# Patient Record
Sex: FEMALE | Race: WHITE | NOT HISPANIC OR LATINO | Employment: OTHER | URBAN - NONMETROPOLITAN AREA
[De-identification: names, ages, dates, MRNs, and addresses within clinical notes are randomized per-mention and may not be internally consistent; named-entity substitution may affect disease eponyms.]

---

## 2023-03-14 DIAGNOSIS — G43.909 MIGRAINE WITHOUT STATUS MIGRAINOSUS, NOT INTRACTABLE, UNSPECIFIED MIGRAINE TYPE: Primary | ICD-10-CM

## 2023-03-14 RX ORDER — TOPIRAMATE 50 MG/1
50 TABLET, FILM COATED ORAL 2 TIMES DAILY
Qty: 180 TABLET | Refills: 1 | Status: SHIPPED | OUTPATIENT
Start: 2023-03-14 | End: 2023-08-14

## 2023-05-22 DIAGNOSIS — J42 CHRONIC BRONCHITIS, UNSPECIFIED CHRONIC BRONCHITIS TYPE (MULTI): Primary | ICD-10-CM

## 2023-05-22 RX ORDER — FLUTICASONE FUROATE, UMECLIDINIUM BROMIDE AND VILANTEROL TRIFENATATE 100; 62.5; 25 UG/1; UG/1; UG/1
POWDER RESPIRATORY (INHALATION)
Qty: 90 EACH | Refills: 3 | Status: SHIPPED | OUTPATIENT
Start: 2023-05-22

## 2023-06-15 ENCOUNTER — OFFICE VISIT (OUTPATIENT)
Dept: PRIMARY CARE | Facility: CLINIC | Age: 78
End: 2023-06-15
Payer: MEDICARE

## 2023-06-15 VITALS
WEIGHT: 114 LBS | SYSTOLIC BLOOD PRESSURE: 118 MMHG | BODY MASS INDEX: 23.03 KG/M2 | DIASTOLIC BLOOD PRESSURE: 68 MMHG | HEART RATE: 62 BPM | OXYGEN SATURATION: 98 %

## 2023-06-15 DIAGNOSIS — J44.9 CHRONIC OBSTRUCTIVE PULMONARY DISEASE, UNSPECIFIED COPD TYPE (MULTI): ICD-10-CM

## 2023-06-15 DIAGNOSIS — R73.03 PREDIABETES: ICD-10-CM

## 2023-06-15 DIAGNOSIS — F32.5 DEPRESSION, MAJOR, IN REMISSION (CMS-HCC): ICD-10-CM

## 2023-06-15 DIAGNOSIS — E23.6 PITUITARY GLAND ENLARGED (MULTI): ICD-10-CM

## 2023-06-15 DIAGNOSIS — G91.9 HYDROCEPHALUS, UNSPECIFIED TYPE (MULTI): ICD-10-CM

## 2023-06-15 DIAGNOSIS — G43.109 MIGRAINE WITH AURA AND WITHOUT STATUS MIGRAINOSUS, NOT INTRACTABLE: ICD-10-CM

## 2023-06-15 DIAGNOSIS — I10 BENIGN ESSENTIAL HYPERTENSION: Primary | ICD-10-CM

## 2023-06-15 PROBLEM — R90.89 ABNORMAL BRAIN MRI: Status: ACTIVE | Noted: 2023-06-15

## 2023-06-15 PROBLEM — M25.511 PAIN IN JOINT OF RIGHT SHOULDER: Status: ACTIVE | Noted: 2023-06-15

## 2023-06-15 PROBLEM — K21.9 CHRONIC GERD: Status: ACTIVE | Noted: 2023-06-15

## 2023-06-15 PROBLEM — Z98.2 VENTRICULO-PERITONEAL SHUNT STATUS: Status: ACTIVE | Noted: 2023-06-15

## 2023-06-15 PROBLEM — F41.9 ANXIETY DISORDER: Status: ACTIVE | Noted: 2023-06-15

## 2023-06-15 PROBLEM — M79.2 NEURALGIA INVOLVING SCALP: Status: RESOLVED | Noted: 2023-06-15 | Resolved: 2023-06-15

## 2023-06-15 PROBLEM — R06.09 DYSPNEA ON EXERTION: Status: ACTIVE | Noted: 2023-06-15

## 2023-06-15 PROBLEM — M25.512 PAIN IN LEFT SHOULDER: Status: ACTIVE | Noted: 2023-06-15

## 2023-06-15 PROBLEM — M19.90 ARTHRITIS: Status: ACTIVE | Noted: 2023-06-15

## 2023-06-15 PROBLEM — H91.90 DECREASED HEARING: Status: ACTIVE | Noted: 2023-06-15

## 2023-06-15 PROBLEM — M54.2 CERVICALGIA: Status: ACTIVE | Noted: 2023-06-15

## 2023-06-15 PROBLEM — G43.119 INTRACTABLE MIGRAINE EQUIVALENT: Status: RESOLVED | Noted: 2023-06-15 | Resolved: 2023-06-15

## 2023-06-15 PROBLEM — R51.9 OCCIPITAL HEADACHE: Status: RESOLVED | Noted: 2023-06-15 | Resolved: 2023-06-15

## 2023-06-15 PROBLEM — H61.20 WAX IN EAR: Status: RESOLVED | Noted: 2023-06-15 | Resolved: 2023-06-15

## 2023-06-15 PROBLEM — M85.80 OSTEOPENIA: Status: ACTIVE | Noted: 2023-06-15

## 2023-06-15 PROBLEM — E78.00 ELEVATED LDL CHOLESTEROL LEVEL: Status: ACTIVE | Noted: 2023-06-15

## 2023-06-15 PROBLEM — R53.83 OTHER FATIGUE: Status: RESOLVED | Noted: 2023-06-15 | Resolved: 2023-06-15

## 2023-06-15 PROBLEM — R51.9 OCCIPITAL HEADACHE: Status: ACTIVE | Noted: 2023-06-15

## 2023-06-15 PROBLEM — D62 ANEMIA DUE TO ACUTE BLOOD LOSS: Status: ACTIVE | Noted: 2023-06-15

## 2023-06-15 PROBLEM — E87.6 HYPOKALEMIA: Status: ACTIVE | Noted: 2023-06-15

## 2023-06-15 PROBLEM — M75.121 COMPLETE TEAR OF RIGHT ROTATOR CUFF: Status: ACTIVE | Noted: 2023-06-15

## 2023-06-15 PROBLEM — M79.2 NEURALGIA INVOLVING SCALP: Status: ACTIVE | Noted: 2023-06-15

## 2023-06-15 PROBLEM — K11.20 SUBMANDIBULAR SIALOADENITIS: Status: ACTIVE | Noted: 2023-06-15

## 2023-06-15 PROBLEM — H61.20 WAX IN EAR: Status: ACTIVE | Noted: 2023-06-15

## 2023-06-15 PROBLEM — F41.9 ANXIETY DISORDER: Status: RESOLVED | Noted: 2023-06-15 | Resolved: 2023-06-15

## 2023-06-15 PROBLEM — G43.909 HEADACHE, MIGRAINE: Status: ACTIVE | Noted: 2023-06-15

## 2023-06-15 PROBLEM — G43.119 INTRACTABLE MIGRAINE EQUIVALENT: Status: ACTIVE | Noted: 2023-06-15

## 2023-06-15 PROBLEM — K21.9 CHRONIC GERD: Status: RESOLVED | Noted: 2023-06-15 | Resolved: 2023-06-15

## 2023-06-15 PROBLEM — R53.83 OTHER FATIGUE: Status: ACTIVE | Noted: 2023-06-15

## 2023-06-15 PROBLEM — I05.9 DISORDER OF MITRAL VALVE: Status: ACTIVE | Noted: 2023-06-15

## 2023-06-15 PROBLEM — G96.810 INTRACRANIAL HYPOTENSION: Status: ACTIVE | Noted: 2023-06-15

## 2023-06-15 LAB
ALANINE AMINOTRANSFERASE (SGPT) (U/L) IN SER/PLAS: 12 U/L (ref 7–45)
ALBUMIN (G/DL) IN SER/PLAS: 4.4 G/DL (ref 3.4–5)
ALKALINE PHOSPHATASE (U/L) IN SER/PLAS: 67 U/L (ref 33–136)
ANION GAP IN SER/PLAS: 14 MMOL/L (ref 10–20)
ASPARTATE AMINOTRANSFERASE (SGOT) (U/L) IN SER/PLAS: 15 U/L (ref 9–39)
BASOPHILS (10*3/UL) IN BLOOD BY AUTOMATED COUNT: 0.06 X10E9/L (ref 0–0.1)
BASOPHILS/100 LEUKOCYTES IN BLOOD BY AUTOMATED COUNT: 1.3 % (ref 0–2)
BILIRUBIN TOTAL (MG/DL) IN SER/PLAS: 0.8 MG/DL (ref 0–1.2)
CALCIUM (MG/DL) IN SER/PLAS: 9.6 MG/DL (ref 8.6–10.3)
CARBON DIOXIDE, TOTAL (MMOL/L) IN SER/PLAS: 23 MMOL/L (ref 21–32)
CHLORIDE (MMOL/L) IN SER/PLAS: 111 MMOL/L (ref 98–107)
CHOLESTEROL (MG/DL) IN SER/PLAS: 153 MG/DL (ref 0–199)
CHOLESTEROL IN HDL (MG/DL) IN SER/PLAS: 62 MG/DL
CHOLESTEROL/HDL RATIO: 2.5
CREATININE (MG/DL) IN SER/PLAS: 0.84 MG/DL (ref 0.5–1.05)
EOSINOPHILS (10*3/UL) IN BLOOD BY AUTOMATED COUNT: 0.2 X10E9/L (ref 0–0.4)
EOSINOPHILS/100 LEUKOCYTES IN BLOOD BY AUTOMATED COUNT: 4.4 % (ref 0–6)
ERYTHROCYTE DISTRIBUTION WIDTH (RATIO) BY AUTOMATED COUNT: 15.9 % (ref 11.5–14.5)
ERYTHROCYTE MEAN CORPUSCULAR HEMOGLOBIN CONCENTRATION (G/DL) BY AUTOMATED: 31.1 G/DL (ref 32–36)
ERYTHROCYTE MEAN CORPUSCULAR VOLUME (FL) BY AUTOMATED COUNT: 98 FL (ref 80–100)
ERYTHROCYTES (10*6/UL) IN BLOOD BY AUTOMATED COUNT: 3.45 X10E12/L (ref 4–5.2)
GFR FEMALE: 72 ML/MIN/1.73M2
GLUCOSE (MG/DL) IN SER/PLAS: 93 MG/DL (ref 74–99)
HEMATOCRIT (%) IN BLOOD BY AUTOMATED COUNT: 33.8 % (ref 36–46)
HEMOGLOBIN (G/DL) IN BLOOD: 10.5 G/DL (ref 12–16)
IMMATURE GRANULOCYTES/100 LEUKOCYTES IN BLOOD BY AUTOMATED COUNT: 0.4 % (ref 0–0.9)
LDL: 73 MG/DL (ref 0–99)
LEUKOCYTES (10*3/UL) IN BLOOD BY AUTOMATED COUNT: 4.6 X10E9/L (ref 4.4–11.3)
LYMPHOCYTES (10*3/UL) IN BLOOD BY AUTOMATED COUNT: 1.28 X10E9/L (ref 0.8–3)
LYMPHOCYTES/100 LEUKOCYTES IN BLOOD BY AUTOMATED COUNT: 28.1 % (ref 13–44)
MONOCYTES (10*3/UL) IN BLOOD BY AUTOMATED COUNT: 0.34 X10E9/L (ref 0.05–0.8)
MONOCYTES/100 LEUKOCYTES IN BLOOD BY AUTOMATED COUNT: 7.5 % (ref 2–10)
NEUTROPHILS (10*3/UL) IN BLOOD BY AUTOMATED COUNT: 2.65 X10E9/L (ref 1.6–5.5)
NEUTROPHILS/100 LEUKOCYTES IN BLOOD BY AUTOMATED COUNT: 58.3 % (ref 40–80)
PLATELETS (10*3/UL) IN BLOOD AUTOMATED COUNT: 252 X10E9/L (ref 150–450)
POTASSIUM (MMOL/L) IN SER/PLAS: 4.7 MMOL/L (ref 3.5–5.3)
PROTEIN TOTAL: 6.6 G/DL (ref 6.4–8.2)
SODIUM (MMOL/L) IN SER/PLAS: 143 MMOL/L (ref 136–145)
THYROTROPIN (MIU/L) IN SER/PLAS BY DETECTION LIMIT <= 0.05 MIU/L: 0.86 MIU/L (ref 0.44–3.98)
TRIGLYCERIDE (MG/DL) IN SER/PLAS: 91 MG/DL (ref 0–149)
UREA NITROGEN (MG/DL) IN SER/PLAS: 10 MG/DL (ref 6–23)
VLDL: 18 MG/DL (ref 0–40)

## 2023-06-15 PROCEDURE — 80053 COMPREHEN METABOLIC PANEL: CPT

## 2023-06-15 PROCEDURE — 3074F SYST BP LT 130 MM HG: CPT | Performed by: FAMILY MEDICINE

## 2023-06-15 PROCEDURE — 1036F TOBACCO NON-USER: CPT | Performed by: FAMILY MEDICINE

## 2023-06-15 PROCEDURE — 1159F MED LIST DOCD IN RCRD: CPT | Performed by: FAMILY MEDICINE

## 2023-06-15 PROCEDURE — 85025 COMPLETE CBC W/AUTO DIFF WBC: CPT

## 2023-06-15 PROCEDURE — 3078F DIAST BP <80 MM HG: CPT | Performed by: FAMILY MEDICINE

## 2023-06-15 PROCEDURE — 83036 HEMOGLOBIN GLYCOSYLATED A1C: CPT

## 2023-06-15 PROCEDURE — 99214 OFFICE O/P EST MOD 30 MIN: CPT | Performed by: FAMILY MEDICINE

## 2023-06-15 PROCEDURE — 84443 ASSAY THYROID STIM HORMONE: CPT

## 2023-06-15 PROCEDURE — 80061 LIPID PANEL: CPT

## 2023-06-15 RX ORDER — ALBUTEROL SULFATE 90 UG/1
AEROSOL, METERED RESPIRATORY (INHALATION)
COMMUNITY
Start: 2014-12-27 | End: 2023-08-21

## 2023-06-15 RX ORDER — LISINOPRIL 10 MG/1
1 TABLET ORAL DAILY
COMMUNITY
Start: 2013-07-13 | End: 2023-08-14

## 2023-06-15 RX ORDER — ASPIRIN 81 MG/1
1 TABLET ORAL DAILY
COMMUNITY
Start: 2018-04-26

## 2023-06-15 RX ORDER — AMLODIPINE BESYLATE 5 MG/1
1 TABLET ORAL DAILY
COMMUNITY
Start: 2013-06-29 | End: 2023-06-27 | Stop reason: SDUPTHER

## 2023-06-15 RX ORDER — ESCITALOPRAM OXALATE 20 MG/1
1 TABLET ORAL DAILY
COMMUNITY
Start: 2013-06-16 | End: 2023-06-27 | Stop reason: SDUPTHER

## 2023-06-15 RX ORDER — FLUTICASONE PROPIONATE AND SALMETEROL 250; 50 UG/1; UG/1
POWDER RESPIRATORY (INHALATION) 2 TIMES DAILY
COMMUNITY
End: 2023-09-07 | Stop reason: ALTCHOICE

## 2023-06-15 RX ORDER — BUPROPION HYDROCHLORIDE 300 MG/1
1 TABLET ORAL DAILY
COMMUNITY
Start: 2013-06-29 | End: 2023-06-27 | Stop reason: SDUPTHER

## 2023-06-15 RX ORDER — ACETAMINOPHEN 500 MG
TABLET ORAL EVERY 6 HOURS PRN
COMMUNITY
End: 2023-09-07 | Stop reason: ALTCHOICE

## 2023-06-15 RX ORDER — OMEPRAZOLE 20 MG/1
1 CAPSULE, DELAYED RELEASE ORAL DAILY
COMMUNITY
Start: 2018-04-20 | End: 2023-06-27 | Stop reason: SDUPTHER

## 2023-06-15 RX ORDER — ATORVASTATIN CALCIUM 80 MG/1
1 TABLET, FILM COATED ORAL NIGHTLY
COMMUNITY
Start: 2012-08-01 | End: 2023-06-27 | Stop reason: SDUPTHER

## 2023-06-15 NOTE — PROGRESS NOTES
"Subjective   Patient ID: Paz Farmer is a 77 y.o. female who presents for Follow-up.    HPI     MEDICARE WELLNESS WAS IN JANUARY    We had a telehealth appt in march - mood was much worse-   Seemed to correspond to increase of Topirimate -   I decreased it back down to 50 mg BID      Updates and concerns -      Did have to have shunt revision with DR Ibarra - 12/27/22  \"Right Ventriculoperitoneal Shunt Valve Exchange\"   Was having intracranial hypotension - severe headaches -   Head feels great.   No headaches.      Would like to try to get rid of omeprazole too        Chronic issues and meds:      HTN - amlodipine 5 mg daily; lisinopril 10 mg a day; BPs are running well  Stress test 5/2018 WNL, EF 60%   2010 - Coronary Cath done - clear but a small rt cor artery      Prediabetes: Last A1C - 1/2023 -  5.7%   Nutrition: has been eating better ; KEEPING WT OFF     Upper teeth have been removed - issues with getting a plate that doesn't hurt      COPD - TRELEGY QD , Proair prn - WORKING GREAT      Hyperchol - atorvastatin 80 mg a day      Chronic migraines - Topirimate 50 mg  mg BID   She was taking a lot of aspirin and aleve before the topirimate     MRI with enlarged pituitary - saw endocrinology - labs were fine, she says she did visual field test and it was ok.      Depression - Bupropion  mg a day, escitalopram 20 mg a day - Mood is doing much better      LABS JULY 2021 - Renal function mildly low -   and HBG mildly low - stool cards deedee Price is her significant other - they have been together 35 years         Review of Systems    Objective   /68 (BP Location: Right leg, Patient Position: Sitting, BP Cuff Size: Adult)   Pulse 62   Wt 51.7 kg (114 lb)   SpO2 98%   BMI 23.03 kg/m²     Physical Exam  Vitals reviewed.   Constitutional:       General: She is not in acute distress.     Appearance: Normal appearance.   HENT:      Head: Normocephalic and atraumatic.      Nose: Nose normal. "      Mouth/Throat:      Pharynx: No posterior oropharyngeal erythema.      Comments: Edentulous top teeth  Eyes:      Extraocular Movements: Extraocular movements intact.      Conjunctiva/sclera: Conjunctivae normal.      Pupils: Pupils are equal, round, and reactive to light.   Cardiovascular:      Rate and Rhythm: Normal rate and regular rhythm.      Heart sounds: Normal heart sounds. No murmur heard.  Pulmonary:      Effort: Pulmonary effort is normal. No respiratory distress.      Breath sounds: Normal breath sounds. No wheezing.   Musculoskeletal:      Cervical back: Neck supple.   Lymphadenopathy:      Cervical: No cervical adenopathy.   Skin:     General: Skin is warm and dry.      Findings: No rash.   Neurological:      General: No focal deficit present.      Mental Status: She is alert.   Psychiatric:         Mood and Affect: Mood normal.         Thought Content: Thought content normal.         Assessment/Plan   Problem List Items Addressed This Visit          High    Benign essential hypertension - Primary    Chronic obstructive pulmonary disease (CMS/HCC)     Stable with Trelegy          Prediabetes       Medium    Depression, major, in remission (CMS/HCC)     Mood doing well - no change in meds          Headache, migraine    Pituitary gland enlarged (CMS/HCC)     Was felt to be benign and stable          Hydrocephalus, unspecified type (CMS/HCC)     Shunt in place - doing well             Doing very well -   Going to try to wean off the topirimate and omeprazole     Not other med changes    Labs today    Discussed finding a dentist that will help her get a better upper plate     We discussed at visit any disease processes that were of concern as well as the risks, benefits and instructions of any new medication provided.    See orders and discussion section for information handed to patient on their Clinical Summary.   Patient (and/or caretaker of patient if present)  stated all questions were answered,  and they voiced understanding of instructions.

## 2023-06-15 NOTE — PATIENT INSTRUCTIONS
Lets try to wean you off of your topirmate and see if you still need it -   Take 1/2 tab less every 5 days until you are off it   So -   25 mg in the AM and 50 mg PM for 5 days -   Then 25mg twice a day for 5 days   Then 25 mg PM only for 5 days  - then stop taking it.    IF headaches come back - then you need to slowly increase it back up to 50 mg twice a day       Then if you are fine - can try weaning off omeprazole -   Take it every other day for 1 weeks - then every 3 days for 2 weeks - then stop it.       Hypertension Reminders:    IF YOU ARE A PERSON WHOSE BLOOD PRESSURE RUNS HIGH IN THE DOCTOR'S OFFICE,  THEN WE NEED TO VERIFY YOUR CUFF AT LEAST  ONCE YEARLY.   ALWAYS BRING CUFF WITH YOU TO ANY HYPERTENSION CHECK UP APPOINTMENT.  WE CAN RECORD YOUR BP  FROM HOME THE DAY OF THE APPOINTMENT - BUT WE HAVE TO SEE IT ON YOUR MACHINE.      To accurately check your blood pressure -  be sure to sit and relax for 5 minutes, you need your back supported, feet flat on the ground, arm heart level and relaxed.    Generally speaking, well controlled hypertension is below 130/80   for  most people  and if you are over 75, below 140/90  is acceptable.    Please take your medication as directed, and if you forget a dose  DO NOT DOUBLE THE DOSE THE NEXT DAY, just take is as you normally would.     It is important to stay on a low sodium diet :  1500 - 2000 mg of sodium a day  -  it is important to read labels.    Regular Exercise is very important as well.  Always gradually increase your exercise regimen.  Your goal is 30 minutes of a good cardiovascular exercise at least 5 days a week.    IF YOUR BMI (BODY MASS INDEX) IS OVER 25, LOSING WEIGHT WILL HELP CONTROL YOUR BLOOD PRESSURE.   Talk to me further if you need help doing this.        It is very important NOT to smoke  or use any tobacco products.  Talk to me about options if you want help quitting.    It is very important to keep your alcohol in take low.   Generally  speaking, adult men should not drink more than 2 regular size beers a day, or no more than 2 ounces of liquor, or no more than 12 ounces of wine.  For adult women - the recommendations are half that.    BUT , THIS IS NOT UNIVERSAL   - be sure to ask me if alcohol is safe for you to drink, and if so, the acceptable amount.          PLEASE PLAN YOUR NEXT APPOINTMENT WITH ME IN    January - Medicare wellness         ;   ALWAYS BE SURE TO CALL AT LEAST 6 WEEKS AHEAD OF WHEN YOU NEED THE APPOINTMENT TO BE SCHEDULED.      IF I HAVE TOLD YOU TO GET LABS AHEAD OF THE APPOINTMENT WITH ME TO GO OVER TOGETHER AT OUR APPOINTMENT,  BE SURE TO MAKE A LAB APPOINTMENT A FEW DAYS AHEAD OF YOUR VISIT WITH ME, AND LEAVE ME A MESSAGE CLOSE TO THE LAB APPT DATE TO REMIND ME TO PLACE ORDERS FOR THOSE LABS.      THANK YOU!

## 2023-06-16 LAB
ESTIMATED AVERAGE GLUCOSE FOR HBA1C: 120 MG/DL
HEMOGLOBIN A1C/HEMOGLOBIN TOTAL IN BLOOD: 5.8 %

## 2023-06-27 DIAGNOSIS — K21.9 GASTROESOPHAGEAL REFLUX DISEASE WITHOUT ESOPHAGITIS: ICD-10-CM

## 2023-06-27 DIAGNOSIS — F32.5 DEPRESSION, MAJOR, IN REMISSION (CMS-HCC): Primary | ICD-10-CM

## 2023-06-27 DIAGNOSIS — E78.00 ELEVATED LDL CHOLESTEROL LEVEL: ICD-10-CM

## 2023-06-27 DIAGNOSIS — I10 BENIGN ESSENTIAL HYPERTENSION: ICD-10-CM

## 2023-06-27 RX ORDER — AMLODIPINE BESYLATE 5 MG/1
TABLET ORAL
Qty: 90 TABLET | Refills: 1 | Status: SHIPPED | OUTPATIENT
Start: 2023-06-27 | End: 2023-12-18

## 2023-06-27 RX ORDER — BUPROPION HYDROCHLORIDE 300 MG/1
TABLET ORAL
Qty: 90 TABLET | Refills: 1 | Status: SHIPPED | OUTPATIENT
Start: 2023-06-27 | End: 2023-12-18

## 2023-06-27 RX ORDER — OMEPRAZOLE 20 MG/1
CAPSULE, DELAYED RELEASE ORAL
Qty: 90 CAPSULE | Refills: 1 | Status: SHIPPED | OUTPATIENT
Start: 2023-06-27 | End: 2023-09-07 | Stop reason: ALTCHOICE

## 2023-06-27 RX ORDER — ATORVASTATIN CALCIUM 80 MG/1
TABLET, FILM COATED ORAL
Qty: 90 TABLET | Refills: 1 | Status: SHIPPED | OUTPATIENT
Start: 2023-06-27 | End: 2023-12-18

## 2023-06-27 RX ORDER — ESCITALOPRAM OXALATE 20 MG/1
TABLET ORAL
Qty: 90 TABLET | Refills: 3 | Status: SHIPPED | OUTPATIENT
Start: 2023-06-27

## 2023-08-13 DIAGNOSIS — G43.909 MIGRAINE WITHOUT STATUS MIGRAINOSUS, NOT INTRACTABLE, UNSPECIFIED MIGRAINE TYPE: ICD-10-CM

## 2023-08-13 DIAGNOSIS — I10 BENIGN ESSENTIAL HYPERTENSION: Primary | ICD-10-CM

## 2023-08-14 RX ORDER — TOPIRAMATE 50 MG/1
50 TABLET, FILM COATED ORAL 2 TIMES DAILY
Qty: 180 TABLET | Refills: 1 | Status: SHIPPED | OUTPATIENT
Start: 2023-08-14 | End: 2023-09-07 | Stop reason: ALTCHOICE

## 2023-08-14 RX ORDER — LISINOPRIL 10 MG/1
10 TABLET ORAL DAILY
Qty: 90 TABLET | Refills: 3 | Status: SHIPPED | OUTPATIENT
Start: 2023-08-14

## 2023-08-20 DIAGNOSIS — J44.9 CHRONIC OBSTRUCTIVE PULMONARY DISEASE, UNSPECIFIED COPD TYPE (MULTI): Primary | ICD-10-CM

## 2023-08-21 RX ORDER — ALBUTEROL SULFATE 90 UG/1
AEROSOL, METERED RESPIRATORY (INHALATION)
Qty: 54 G | Refills: 3 | Status: SHIPPED | OUTPATIENT
Start: 2023-08-21

## 2023-09-07 ENCOUNTER — OFFICE VISIT (OUTPATIENT)
Dept: PRIMARY CARE | Facility: CLINIC | Age: 78
End: 2023-09-07
Payer: MEDICARE

## 2023-09-07 VITALS
HEIGHT: 58 IN | SYSTOLIC BLOOD PRESSURE: 110 MMHG | OXYGEN SATURATION: 97 % | WEIGHT: 114.4 LBS | DIASTOLIC BLOOD PRESSURE: 70 MMHG | BODY MASS INDEX: 24.01 KG/M2 | HEART RATE: 67 BPM

## 2023-09-07 DIAGNOSIS — Z01.818 PREOPERATIVE EXAMINATION: Primary | ICD-10-CM

## 2023-09-07 PROBLEM — M75.102 TEAR OF LEFT ROTATOR CUFF: Status: RESOLVED | Noted: 2023-09-07 | Resolved: 2023-09-07

## 2023-09-07 PROCEDURE — 1125F AMNT PAIN NOTED PAIN PRSNT: CPT

## 2023-09-07 PROCEDURE — 1160F RVW MEDS BY RX/DR IN RCRD: CPT

## 2023-09-07 PROCEDURE — 3074F SYST BP LT 130 MM HG: CPT

## 2023-09-07 PROCEDURE — 3078F DIAST BP <80 MM HG: CPT

## 2023-09-07 PROCEDURE — 1159F MED LIST DOCD IN RCRD: CPT

## 2023-09-07 PROCEDURE — 1036F TOBACCO NON-USER: CPT

## 2023-09-07 PROCEDURE — 99213 OFFICE O/P EST LOW 20 MIN: CPT

## 2023-09-07 NOTE — PROGRESS NOTES
Subjective   Patient ID: Paz Farmer is a 77 y.o. female who presents for Pre-op Exam (cataracts).  VITA Mullen presents for pre-op exam for cataract surgery on 9/20/23 (R) and 10/4/2023 (L) with Dr. Ibrahim.   She says she feels good otherwise, no other concerns. She is excited for her surgery to be able to see better.    Revised Cardiac Index:   How did you do previously with anesthesia? Has had many surgeries before she did fine, no concerns   Can you walk up 2 flights of stairs without having chest pain? Yes  MI hx: NO  CVA hx: NO  CKD/Cr >2.0: NO  CHF: NO  DM using insulin: NO  High risk surgery: NO    Class I Risk: 0 Points - 3.9% 30-day risk of death, MI, or cardiac arrest     Surgical Risk Assessment:   Prior Anesthesia: She had prior anesthesia, no prior adverse reaction to edidural anesthesia and no prior adverse reaction to general anesthesia.   Exercise Capacity: able to walk two flights of stairs without symptoms.   Lifestyle Factors: denies alcohol use, denies tobacco use and denies illegal drug use.   Quit smoking in 2002 - smoked for 4 years or so, 1/2 pack a day   Symptoms: no easy bleeding, some easy bruising is on ASA 81mg, no chest pain, no cough, no dyspnea, no edema, no palpitations and no wheezing.   Has COPD   Does walk every other day for exercise     Patient Active Problem List   Diagnosis    Abnormal brain MRI    Anemia due to acute blood loss    Arthritis    Benign essential hypertension    Chronic obstructive pulmonary disease (CMS/HCC)    Complete tear of right rotator cuff    Decreased hearing    Depression, major, in remission (CMS/HCC)    Disorder of mitral valve    Dyspnea on exertion    Elevated LDL cholesterol level    Headache, migraine    Hypokalemia    Intracranial hypotension    Cervicalgia    Osteopenia    Pain in joint of right shoulder    Pain in left shoulder    Pituitary gland enlarged (CMS/HCC)    Prediabetes    Submandibular sialoadenitis    Ventriculo-peritoneal  shunt status    Hydrocephalus, unspecified type (CMS/[facility])     Past Surgical History:   Procedure Laterality Date    APPENDECTOMY  2013    Appendectomy    CARDIAC CATHETERIZATION  10/30/2014    Cardiac Cath Procedure Summary     SECTION, LOW TRANSVERSE  2013     Section Low Transverse    COLONOSCOPY  2013    Complete Colonoscopy    CYSTOSCOPY  2013    Diagnostic Cystoscopy    FOOT SURGERY  2013    Foot Surgery    MR HEAD ANGIO WO IV CONTRAST  2015    MR HEAD ANGIO WO IV CONTRAST 2015 [facility]    OTHER SURGICAL HISTORY  2022    Laparoscopy    OTHER SURGICAL HISTORY  2022    Hernia repair    OTHER SURGICAL HISTORY  2013    Ear Surgery    OTHER SURGICAL HISTORY  2022    Brain Surgery    OTHER SURGICAL HISTORY  2013    Ventricular Shunt    OTHER SURGICAL HISTORY  2013    Arthroscopy Shoulder Right    STOMACH SURGERY  2013    Gastric Surgery    TOTAL SHOULDER ARTHROPLASTY  2019    Shoulder Arthroplasty Total Shoulder Replacement      Past Medical History:   Diagnosis Date    Body mass index (BMI) 27.0-27.9, adult 2021    Body mass index (BMI) of 27.0 to 27.9 in adult    Body mass index (BMI) 28.0-28.9, adult 10/13/2020    Body mass index (BMI) of 28.0 to 28.9 in adult    Cutaneous abscess of unspecified hand 2014    Abscess of finger    Disease of jaws, unspecified 2016    Disorder of jaw    Dysuria 2013    Dysuria    Encounter for follow-up examination after completed treatment for conditions other than malignant neoplasm 2013    Follow-up exam    Encounter for other preprocedural examination 2014    Pre-op testing    Headache, unspecified 2022    Chronic intractable headache, unspecified headache type    Migraine, unspecified, not intractable, without status migrainosus 2022    Headache, migraine    Noninfective gastroenteritis and colitis, unspecified      Acute colitis    Other conditions influencing health status 12/11/2020    History of cough    Other conditions influencing health status 11/05/2014    Cyst    Other conditions influencing health status 11/16/2022    Craniotomy (Diagnostic)    Other diseases of salivary glands 09/13/2021    Salivary duct obstruction    Other specified disorders of left ear 11/10/2017    Ear canal mass, left    Personal history of diseases of the blood and blood-forming organs and certain disorders involving the immune mechanism 10/25/2019    History of anemia    Personal history of diseases of the blood and blood-forming organs and certain disorders involving the immune mechanism 08/05/2015    History of anemia    Personal history of other diseases of the digestive system 08/30/2021    History of parotitis    Personal history of other diseases of the musculoskeletal system and connective tissue 10/13/2020    History of bone disorder    Personal history of other endocrine, nutritional and metabolic disease 06/11/2019    History of hypokalemia    Personal history of other specified conditions 01/19/2018    History of gross hematuria    Personal history of other specified conditions     History of chest pain    Personal history of other specified conditions 04/13/2022    History of dysuria    Personal history of urinary (tract) infections 07/22/2013    Personal history of urinary tract infection    Personal history of urinary (tract) infections 04/19/2022    History of urinary tract infection    Right upper quadrant pain 04/04/2022    Abdominal pain, RUQ (right upper quadrant)    Sialolithiasis 08/30/2021    Stone of salivary gland    Tear of left rotator cuff 09/07/2023    Unspecified chronic otitis externa, left ear 07/22/2013    Chronic otitis externa of left ear    Unspecified otitis externa, left ear 01/26/2018    Otitis externa, left    Vomiting, unspecified 04/04/2022    Vomiting and diarrhea     Social History     Tobacco Use     "Smoking status: Former     Packs/day: 0.50     Years: 4.00     Additional pack years: 0.00     Total pack years: 2.00     Types: Cigarettes     Quit date:      Years since quittin.6    Smokeless tobacco: Never   Vaping Use    Vaping Use: Never used   Substance Use Topics    Alcohol use: Never    Drug use: Never      Review of Systems  10 point review of systems performed and is negative except as noted in the HPI.    No Known Allergies      Current Outpatient Medications:     albuterol 90 mcg/actuation inhaler, USE 1 TO 2 INHALATIONS     ORALLY EVERY 4 TO 6 HOURS  AS NEEDED AND AS DIRECTED, Disp: 54 g, Rfl: 3    amLODIPine (Norvasc) 5 mg tablet, TAKE 1 TABLET DAILY, Disp: 90 tablet, Rfl: 1    aspirin 81 mg EC tablet, Take 1 tablet (81 mg) by mouth once daily., Disp: , Rfl:     atorvastatin (Lipitor) 80 mg tablet, TAKE 1 TABLET AT BEDTIME, Disp: 90 tablet, Rfl: 1    buPROPion XL (Wellbutrin XL) 300 mg 24 hr tablet, TAKE 1 TABLET DAILY, Disp: 90 tablet, Rfl: 1    escitalopram (Lexapro) 20 mg tablet, TAKE 1 TABLET DAILY, Disp: 90 tablet, Rfl: 3    fluticasone-umeclidin-vilanter (Trelegy Ellipta) 100-62.5-25 mcg blister with device, USE 1 INHALATION ORALLY    DAILY (TO REPLACE ADVAIR   AND INCRUSE), Disp: 90 each, Rfl: 3    lisinopril 10 mg tablet, TAKE 1 TABLET DAILY, Disp: 90 tablet, Rfl: 3     Objective   /70   Pulse 67   Ht 1.473 m (4' 10\")   Wt 51.9 kg (114 lb 6.4 oz)   SpO2 97%   BMI 23.91 kg/m²     Physical Exam  Constitutional:       General: She is not in acute distress.     Appearance: Normal appearance. She is not ill-appearing or toxic-appearing.   HENT:      Head: Normocephalic and atraumatic.      Comments:  Edentulous top teeth     Right Ear: Tympanic membrane, ear canal and external ear normal.      Left Ear: Tympanic membrane, ear canal and external ear normal.      Nose: Nose normal. No congestion or rhinorrhea.      Mouth/Throat:      Mouth: Mucous membranes are moist.      " Pharynx: Oropharynx is clear. No oropharyngeal exudate or posterior oropharyngeal erythema.   Eyes:      Conjunctiva/sclera: Conjunctivae normal.      Pupils: Pupils are equal, round, and reactive to light.   Neck:      Vascular: No carotid bruit.      Trachea: Trachea normal.   Cardiovascular:      Rate and Rhythm: Normal rate and regular rhythm.      Pulses: Normal pulses.      Heart sounds: Normal heart sounds. No murmur heard.  Pulmonary:      Effort: Pulmonary effort is normal.      Breath sounds: Normal breath sounds. No wheezing, rhonchi or rales.   Abdominal:      General: Bowel sounds are normal. There is no distension.      Palpations: Abdomen is soft. There is no mass.      Tenderness: There is no abdominal tenderness. There is no guarding or rebound.   Musculoskeletal:         General: Normal range of motion.      Cervical back: Normal range of motion and neck supple. No tenderness.      Right lower leg: No edema.      Left lower leg: No edema.   Lymphadenopathy:      Cervical: No cervical adenopathy.   Skin:     General: Skin is warm and dry.      Findings: No rash.   Neurological:      Mental Status: She is alert and oriented to person, place, and time.   Psychiatric:         Mood and Affect: Mood normal.         Behavior: Behavior normal.         Assessment/Plan   Problem List Items Addressed This Visit    None  Visit Diagnoses       Preoperative examination    -  Primary          Paz is cleared for cataract surgery. Follow up as needed.    Chronic conditions:     Benign Essential HTN  -Stable on lisinopril 10mg and amlodipine 5mg     COPD   -Stable with trelegy  -Uses albuterol as needed    Depression   -Mood is doing well   -Stable on lexapro 20 and wellubtrin 300     Hydrocephalus   -Shunt in place     Discussed at visit any disease processes that were of concern as well as the risks, benefits and instructions on any new medication provided. Patient (and/or caretaker of patient if present) stated  all questions were answered, and they voiced understanding of instructions.

## 2023-12-18 DIAGNOSIS — I10 BENIGN ESSENTIAL HYPERTENSION: ICD-10-CM

## 2023-12-18 DIAGNOSIS — E78.00 ELEVATED LDL CHOLESTEROL LEVEL: ICD-10-CM

## 2023-12-18 DIAGNOSIS — F32.5 DEPRESSION, MAJOR, IN REMISSION (CMS-HCC): ICD-10-CM

## 2023-12-18 RX ORDER — BUPROPION HYDROCHLORIDE 300 MG/1
TABLET ORAL
Qty: 90 TABLET | Refills: 1 | Status: SHIPPED | OUTPATIENT
Start: 2023-12-18

## 2023-12-18 RX ORDER — ATORVASTATIN CALCIUM 80 MG/1
TABLET, FILM COATED ORAL
Qty: 90 TABLET | Refills: 1 | Status: SHIPPED | OUTPATIENT
Start: 2023-12-18

## 2023-12-18 RX ORDER — AMLODIPINE BESYLATE 5 MG/1
TABLET ORAL
Qty: 90 TABLET | Refills: 1 | Status: SHIPPED | OUTPATIENT
Start: 2023-12-18

## 2024-01-11 ENCOUNTER — OFFICE VISIT (OUTPATIENT)
Dept: PRIMARY CARE | Facility: CLINIC | Age: 79
End: 2024-01-11
Payer: MEDICARE

## 2024-01-11 VITALS
DIASTOLIC BLOOD PRESSURE: 72 MMHG | BODY MASS INDEX: 23.79 KG/M2 | SYSTOLIC BLOOD PRESSURE: 118 MMHG | OXYGEN SATURATION: 98 % | HEIGHT: 59 IN | HEART RATE: 72 BPM | WEIGHT: 118 LBS

## 2024-01-11 DIAGNOSIS — M85.80 OSTEOPENIA, UNSPECIFIED LOCATION: ICD-10-CM

## 2024-01-11 DIAGNOSIS — Z78.0 POSTMENOPAUSAL ESTROGEN DEFICIENCY: ICD-10-CM

## 2024-01-11 DIAGNOSIS — Z12.11 COLON CANCER SCREENING: ICD-10-CM

## 2024-01-11 DIAGNOSIS — I10 BENIGN ESSENTIAL HYPERTENSION: ICD-10-CM

## 2024-01-11 DIAGNOSIS — Z00.00 ROUTINE GENERAL MEDICAL EXAMINATION AT HEALTH CARE FACILITY: Primary | ICD-10-CM

## 2024-01-11 DIAGNOSIS — R73.03 PREDIABETES: ICD-10-CM

## 2024-01-11 LAB
25(OH)D3 SERPL-MCNC: 46 NG/ML (ref 30–100)
ALBUMIN SERPL BCP-MCNC: 4.4 G/DL (ref 3.4–5)
ALP SERPL-CCNC: 61 U/L (ref 33–136)
ALT SERPL W P-5'-P-CCNC: 13 U/L (ref 7–45)
ANION GAP SERPL CALC-SCNC: 14 MMOL/L (ref 10–20)
AST SERPL W P-5'-P-CCNC: 17 U/L (ref 9–39)
BASOPHILS # BLD AUTO: 0.05 X10*3/UL (ref 0–0.1)
BASOPHILS NFR BLD AUTO: 1.2 %
BILIRUB SERPL-MCNC: 0.8 MG/DL (ref 0–1.2)
BUN SERPL-MCNC: 12 MG/DL (ref 6–23)
CALCIUM SERPL-MCNC: 9.5 MG/DL (ref 8.6–10.3)
CHLORIDE SERPL-SCNC: 105 MMOL/L (ref 98–107)
CHOLEST SERPL-MCNC: 219 MG/DL (ref 0–199)
CHOLESTEROL/HDL RATIO: 3.6
CO2 SERPL-SCNC: 28 MMOL/L (ref 21–32)
CREAT SERPL-MCNC: 0.96 MG/DL (ref 0.5–1.05)
EGFRCR SERPLBLD CKD-EPI 2021: 61 ML/MIN/1.73M*2
EOSINOPHIL # BLD AUTO: 0.19 X10*3/UL (ref 0–0.4)
EOSINOPHIL NFR BLD AUTO: 4.6 %
ERYTHROCYTE [DISTWIDTH] IN BLOOD BY AUTOMATED COUNT: 14.7 % (ref 11.5–14.5)
EST. AVERAGE GLUCOSE BLD GHB EST-MCNC: 111 MG/DL
GLUCOSE SERPL-MCNC: 85 MG/DL (ref 74–99)
HBA1C MFR BLD: 5.5 %
HCT VFR BLD AUTO: 36 % (ref 36–46)
HDLC SERPL-MCNC: 61.1 MG/DL
HGB BLD-MCNC: 11.7 G/DL (ref 12–16)
IMM GRANULOCYTES # BLD AUTO: 0.01 X10*3/UL (ref 0–0.5)
IMM GRANULOCYTES NFR BLD AUTO: 0.2 % (ref 0–0.9)
LDLC SERPL CALC-MCNC: 117 MG/DL
LYMPHOCYTES # BLD AUTO: 1.34 X10*3/UL (ref 0.8–3)
LYMPHOCYTES NFR BLD AUTO: 32.8 %
MCH RBC QN AUTO: 31.7 PG (ref 26–34)
MCHC RBC AUTO-ENTMCNC: 32.5 G/DL (ref 32–36)
MCV RBC AUTO: 98 FL (ref 80–100)
MONOCYTES # BLD AUTO: 0.35 X10*3/UL (ref 0.05–0.8)
MONOCYTES NFR BLD AUTO: 8.6 %
NEUTROPHILS # BLD AUTO: 2.15 X10*3/UL (ref 1.6–5.5)
NEUTROPHILS NFR BLD AUTO: 52.6 %
NON HDL CHOLESTEROL: 158 MG/DL (ref 0–149)
NRBC BLD-RTO: 0 /100 WBCS (ref 0–0)
PLATELET # BLD AUTO: 280 X10*3/UL (ref 150–450)
POTASSIUM SERPL-SCNC: 4.8 MMOL/L (ref 3.5–5.3)
PROT SERPL-MCNC: 6.7 G/DL (ref 6.4–8.2)
RBC # BLD AUTO: 3.69 X10*6/UL (ref 4–5.2)
SODIUM SERPL-SCNC: 142 MMOL/L (ref 136–145)
TRIGL SERPL-MCNC: 206 MG/DL (ref 0–149)
TSH SERPL-ACNC: 1.27 MIU/L (ref 0.44–3.98)
VLDL: 41 MG/DL (ref 0–40)
WBC # BLD AUTO: 4.1 X10*3/UL (ref 4.4–11.3)

## 2024-01-11 PROCEDURE — 85025 COMPLETE CBC W/AUTO DIFF WBC: CPT

## 2024-01-11 PROCEDURE — 80061 LIPID PANEL: CPT

## 2024-01-11 PROCEDURE — 83036 HEMOGLOBIN GLYCOSYLATED A1C: CPT

## 2024-01-11 PROCEDURE — 1170F FXNL STATUS ASSESSED: CPT | Performed by: FAMILY MEDICINE

## 2024-01-11 PROCEDURE — G0439 PPPS, SUBSEQ VISIT: HCPCS | Performed by: FAMILY MEDICINE

## 2024-01-11 PROCEDURE — G0009 ADMIN PNEUMOCOCCAL VACCINE: HCPCS | Performed by: FAMILY MEDICINE

## 2024-01-11 PROCEDURE — 1159F MED LIST DOCD IN RCRD: CPT | Performed by: FAMILY MEDICINE

## 2024-01-11 PROCEDURE — 1160F RVW MEDS BY RX/DR IN RCRD: CPT | Performed by: FAMILY MEDICINE

## 2024-01-11 PROCEDURE — 1125F AMNT PAIN NOTED PAIN PRSNT: CPT | Performed by: FAMILY MEDICINE

## 2024-01-11 PROCEDURE — 99214 OFFICE O/P EST MOD 30 MIN: CPT | Performed by: FAMILY MEDICINE

## 2024-01-11 PROCEDURE — G0008 ADMIN INFLUENZA VIRUS VAC: HCPCS | Performed by: FAMILY MEDICINE

## 2024-01-11 PROCEDURE — 90677 PCV20 VACCINE IM: CPT | Performed by: FAMILY MEDICINE

## 2024-01-11 PROCEDURE — 36415 COLL VENOUS BLD VENIPUNCTURE: CPT

## 2024-01-11 PROCEDURE — 3078F DIAST BP <80 MM HG: CPT | Performed by: FAMILY MEDICINE

## 2024-01-11 PROCEDURE — 3074F SYST BP LT 130 MM HG: CPT | Performed by: FAMILY MEDICINE

## 2024-01-11 PROCEDURE — 84443 ASSAY THYROID STIM HORMONE: CPT

## 2024-01-11 PROCEDURE — 82306 VITAMIN D 25 HYDROXY: CPT

## 2024-01-11 PROCEDURE — 80053 COMPREHEN METABOLIC PANEL: CPT

## 2024-01-11 PROCEDURE — 1036F TOBACCO NON-USER: CPT | Performed by: FAMILY MEDICINE

## 2024-01-11 PROCEDURE — 90662 IIV NO PRSV INCREASED AG IM: CPT | Performed by: FAMILY MEDICINE

## 2024-01-11 RX ORDER — OMEPRAZOLE 20 MG/1
20 TABLET, DELAYED RELEASE ORAL
COMMUNITY

## 2024-01-11 ASSESSMENT — ACTIVITIES OF DAILY LIVING (ADL)
BATHING: INDEPENDENT
DRESSING: INDEPENDENT
GROCERY_SHOPPING: INDEPENDENT
DOING_HOUSEWORK: INDEPENDENT
MANAGING_FINANCES: INDEPENDENT
TAKING_MEDICATION: INDEPENDENT

## 2024-01-11 ASSESSMENT — PATIENT HEALTH QUESTIONNAIRE - PHQ9
2. FEELING DOWN, DEPRESSED OR HOPELESS: NOT AT ALL
SUM OF ALL RESPONSES TO PHQ9 QUESTIONS 1 AND 2: 0
1. LITTLE INTEREST OR PLEASURE IN DOING THINGS: NOT AT ALL

## 2024-01-11 NOTE — PROGRESS NOTES
"Subjective   Reason for Visit: Paz Farmer is an 78 y.o. female here for a Medicare Wellness visit and follow up     Past Medical, Surgical, and Family History reviewed and updated in chart.    Reviewed all medications by prescribing practitioner or clinical pharmacist (such as prescriptions, OTCs, herbal therapies and supplements) and documented in the medical record.    HPI    LOV in July  -     Today -  Medicare wellness and follow up       Updates and concerns:      Today - \"sinuses are acting up \"     Was feeling sick for a week a few weeks ago -   No fever or cough - just drained     Right now  - feels tired   No cough   No PNP -   Does have some nasal mucous -   Worse in the AM or PM   Lots of facial pressure     Restarted her heartburn medication about a month ago  - omeprazole - tried without it and had a lot of heartburn     Has upper dentures - hate them and partial on the bottom  - that hurts       [facility] -  Reviewed Medicare wellness forms -   She states she has a poor appetite -   Not eating well   Wt is stable    MCR WWE in 10/2020 (declines for today)   Last Mamm WNL  5/2023          No breast concerns   No longer needs paps     DEXA osteopenia 3/30/21 - osteopenia      - willing to repeat it when weather is better     Last colonoscopy - 2002 ([facility])  COLOGUARD - NEG 11/2020        Stool cards neg 2021         Willing to do cologuard again          Living will and Medical POA -           Living will on chart - not medical POA            Who is her DTR Lucretia         Vaccines -   FLU vacc:   did not get yet   Has had both pneumonia shots;  last one 2017  - will get Prev 20 today   SHINGRIX - HAD BOTH 2019   COVID - AUG and Sept 2021 - does not want booster   RSV - not yet      Chronic issues and meds:      HTN -   amlodipine 5 mg daily;   lisinopril 10 mg a day;   BPs are running well  Stress test 5/2018 WNL, EF 60%   2010 - Coronary Cath done - clear but a small rt cor artery      Prediabetes: " "Last A1C - 1/2023 -  5.7%   Nutrition: has been eating better ; KEEPING WT OFF      Upper teeth have been removed - issues with getting a plate that doesn't hurt      COPD -   TRELEGY QD ,   Proair prn - WORKING GREAT      Hyperchol - atorvastatin 80 mg a day      Headaches -  off topirimate -   Headaches are better when she wears her teeth     Did have to have shunt revision with DR Ibarra - 12/27/22  \"Right Ventriculoperitoneal Shunt Valve Exchange\"   Was having intracranial hypotension - severe headaches -   Head feels great.   No headaches.    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      Depression - Bupropion  mg a day, escitalopram 20 mg a day - Mood is doing much better      LABS JULY 2021 - Renal function mildly low -   and HBG mildly low - stool cards neg     Has hearing issues - has OTC hearing aids -   Does not want audiology tobias Price is her significant other - they have been together 35 years            Patient Care Team:  Saloni Gregory MD as PCP - General  Saloni Gregory MD as PCP - [employer] PCP     Review of Systems    Objective   Vitals:  /72 (BP Location: Right arm, Patient Position: Sitting, BP Cuff Size: Adult)   Pulse 72   Ht 1.486 m (4' 10.5\")   Wt 53.5 kg (118 lb)   SpO2 98%   BMI 24.24 kg/m²       Physical Exam  Vitals reviewed.   Constitutional:       General: She is not in acute distress.     Appearance: Normal appearance.   HENT:      Head: Normocephalic and atraumatic.      Nose: Nose normal.      Comments: Very dry nares with crusting  - no mucous seen      Mouth/Throat:      Mouth: Mucous membranes are moist.      Pharynx: No posterior oropharyngeal erythema.   Eyes:      Extraocular Movements: Extraocular movements intact.      Conjunctiva/sclera: Conjunctivae normal.      Pupils: Pupils are " equal, round, and reactive to light.   Cardiovascular:      Rate and Rhythm: Normal rate and regular rhythm.      Heart sounds: Normal heart sounds. No murmur heard.  Pulmonary:      Effort: Pulmonary effort is normal. No respiratory distress.      Breath sounds: Normal breath sounds. No wheezing.   Musculoskeletal:      Cervical back: No rigidity.   Lymphadenopathy:      Cervical: No cervical adenopathy.   Skin:     General: Skin is warm and dry.      Findings: No rash.   Neurological:      General: No focal deficit present.      Mental Status: She is alert. Mental status is at baseline.   Psychiatric:         Mood and Affect: Mood normal.         Thought Content: Thought content normal.         Assessment/Plan   Problem List Items Addressed This Visit          High    Benign essential hypertension    Relevant Orders    Comprehensive Metabolic Panel    Lipid Panel    CBC and Auto Differential    TSH with reflex to Free T4 if abnormal    Prediabetes    Relevant Orders    Hemoglobin A1C       Medium    Osteopenia    Relevant Orders    Vitamin D 25-Hydroxy,Total (for eval of Vitamin D levels)     Other Visit Diagnoses       Routine general medical examination at health care facility    -  Primary    Postmenopausal estrogen deficiency        Relevant Orders    XR DEXA bone density    Colon cancer screening        Relevant Orders    Cologuard® colon cancer screening          [facility] wellness  - no WWE with follow up   Issues as above   No med refills needed today     Vacc and labs today     No changes     We discussed at visit any disease processes that were of concern as well as the risks, benefits and instructions of any new medication provided.    See orders and discussion section for information handed to patient on their Clinical Summary.   Patient (and/or caretaker of patient if present)  stated all questions were answered, and they voiced understanding of instructions.

## 2024-01-11 NOTE — PATIENT INSTRUCTIONS
"I need to get a copy of your Medical POA papers     Plan to get the RSV vaccine in a few weeks at the pharmacy   (Flu and Prevnar 20 today)       [facility] a [facility]  - if not  -   Give them a call  [phone]       Try getting a vaporizor going  and Simply saline Nasal spray often       You will always hear about your test results.     The easiest way, if you have a smart phone or computer access, is to sign up for \"My Chart.\"    The electronic way to see your medical record, test results and visit summaries/instructions.   You will see your test results on this system before I do.   But, I will always make comments on the results when I see them.   If over a week goes by and I have not made comments on them,  please let me know.    Something may have gone wrong and I did not see them.    If you are having issues with getting onto My Chart , the patient support  number is [phone].       If you do not have a smart phone or computer access, I can still leave test results on your Secureach card if you have one,   or we can call or mail you your results.   IF a week goes by and you have not heard about your results,  please let me know.          Call [phone] to set up bone density test in the Spring         ABOUT MEDICARE PREVENTATIVE APPOINTMENTS:     YOUR YEARLY MEDICARE WELLNESS VISIT IS VERY IMPORTANT.      Medicare wants all of their patients to schedule their \"Welcome to Medicare\" visit  when you are in the first 12 months of being on Medicare.    Then every year after that, they want you to schedule your  \"Annual Wellness\"  visit.     These visits have a very specific list of topics I have to cover at the visit,  so you will have paperwork you need to complete before I see you.   Of interest,  there is NOT actually a physical exam as part of this visit.       If you are female,   a Well Woman Exam  (Breast exam and pelvic exam) - are paid for by Medicare every 2 years.   " "Mammograms are paid for every year.    If you are due for this exam too,  the Medicare wellness and the well woman exam can be done on the same day,  PLEASE TELL THIS TO  WHEN MAKING THE APPOINTMENT.      Some of the Medicare plans ALSO cover a traditional \"physical\" - which has more of the physical exam component.   You may want to find out if your insurance covers that too.       (this is  the code - 48295)  - That can be added to the visit as well.    You would need to tell us.     IT'S VERY HELPFUL IF YOU KNOW WHAT YOUR PLAN COVERS AHEAD OF THE VISIT.      Please check to see what your plan(s) cover.   (Even checking on testing and vaccines that are covered or not helps us greatly!)     At these appointments ,  IF  we cover any of your chronic medical conditions,  medical concerns,  or medications,  I will also be billing alvin  \"E/M  code\" which stands for \"Evaluation and Management\"    -  which is a regular office visit code.    THAT CHARGE MAY BE SUBJECT TO CO-PAYS AND DEDUCTIBLES.     PLEASE DO NOT \"SAVE\" ALL OF YOUR CONCERNS TO GO OVER AT THESE YEARLY WELLNESS VISITS.   YOU SHOULD BE SCHEDULING SEPARATE APPOINTMENTS WHEN YOU HAVE HEALTH CONCERNS TO DISCUSS AND FOR YOUR MEDICATION CHECK UP APPOINTMENTS.        Thank you.            WELL VISIT/PREVENTATIVE VISIT INSTRUCTIONS:        Call [phone] to schedule any testing ordered at [facility].      For a mammogram, call   [phone] .    Please work on healthy nutrition,  optimal sleep, and regular exercise to feel better.    If you smoke - please quit, and if you drink alcohol, please limit your intake.       Be sure to ask me about any vaccines you may be due for,  and ask me any questions you may have about vaccines.   Generally -  a flu shot is recommended every fall for everyone over 6 months of age,  a pneumonia shot is recommended for anyone 65 and over or who has chronic conditions such as diabetes, heart or lung disease,  the " shingles vaccines are recommended for people age 50 and over,   a Tetnas/Pertussis booster is very 10 years (after the childhood set);  the RSV vaccine is for people age 65 and over,  and the COVID vaccines are recommended for everyone, but the boosters are often particular people, so ask me if you qualify.      There may be more vaccines you qualify for depending on your medical conditions, so be sure to ask me about that if you have any chronic illnesses.    Some people have insurance that will pay for the vaccines at the pharmacy, and some your doctors office - so be sure to check with your insurance about the best place to get your vaccines and your coverage of them.     Generally speaking,  good nutrition consists of lean meats, like chicken, fish and turkey (not fried);    plenty of fruits and vegetables (the fresher the better);   Less sugars, saturated fats, and processed foods - especially those made with white flour.   Try to eat the majority of your grain foods  as whole grains.   Keep an eye on your total calories in a day and serving sizes on packaging - this will help you limit your overall intake.    Remember, to lose weight (if you need to), your total calorie intake has to be about 300 - 500 calories less than what you burn in a day.   That number depends on your age, gender and body size.   Some people find a fitbit (calorie tracking device) helpful.      Please be sure to see the dentist every 6 months.    Please see the eye doctor no longer than every 2 years.    When you have your Living Will and Medical Power of  papers completed   (they have to be witnessed by 2 non-relatives or notarized to be legally binding),     please keep your originals in a safe place in your home, but make sure all your physicians have copies and that you take copies with you when you go to the hospital.        GETTING TEST RESULTS:    YOU WILL ALWAYS FIND OUT ABOUT ALL YOUR TEST RESULTS FROM ME.  I do not use  "the \"no news is good news\" policy.   The only time you would not, is if I have told you to get the testing done, and make a follow up appointment to go over it.    Then I will be waiting to talk to you at the visit about your test results.     I have a few different ways I get test results to you  (if its something urgent, we call you)  :       If you have a Secureach card -  I will have your test results on your secureach card within a week.  You should get a phone call telling you when the results are on the card, or just call the card in a week.   If two weeks go  by and you have not heard anything, call the card to see if the results are there,  and if not,  leave me a message.  If you are having trouble using Secureach, they have a support line you should call :   [phone];   If you have lost your card, I need to know.     IT'S VERY IMPORTANT THAT YOU CALL TO LISTEN TO YOUR RESULTS, AS IT THEN LETS ME KNOW YOU GOT YOUR RESULTS.        If you get your test results on MY CHART,  you may commonly see your results even before I do.      I will always annotate a message on your results so you know that I saw them and how I feel about them.     If you need some help with MY CHART call the support number at [phone].    IF I mail your results to you,   I need to hear from you if you do not receive them within 2 weeks.      Be sure to let me know your preference for getting results.         BILLING FOR PREVENTATIVE VISITS.     Most insurance companies pay for a yearly \"Wellness Check\"  or they may call it a \"Preventative Physical\"   - but it's important to know what your plan covers ahead of the visit.    It's also a good idea to find out what sort of preventative testing they will cover for screening tests - like cholesterol, blood sugar, or colonoscopies. Also, find out which vaccines are covered and if you have any responsibility in paying for them.       If at your Wellness Visit,  we cover anything " "to do with problems/issues  you are having or  chronic medications/ medical conditions you have,   there will ALSO be a regular office charge that day.     Blood work  or testing obtained that has anything to do with an acute issue or chronic condition is billed under that diagnosis and not screening.       It's a good idea to find out from your insurance what is covered and what is your responsibility for all of the above possible charges.           Most importantly,   if you ever have any questions or concerns that cannot wait until your next visit with me,  you can message me through MY CHART or call the office and leave a voice mail message  (please allow for at least 24 hours for responses for these messages).       Take good care.   Dr Cyr              For General Healthy Nutrition    (Remember - NOT A DIET!   Diets are only good for class reunions.)    These are my general good nutrition recommendations for most people.   I use the term \" diet \"  in these instructions to mean your overall nutrition - how you eat and drink.   If we talked about something different during your visit with me,  other than what is written below,  follow that advice instead.       For most people,  eating healthier means getting less added sugar and less processed foods in your diet    The fresher the better.    Added sugar is now a part of the nutrition label on manufactured food, so you can keep an eye on it easier.    But basically,  foods and beverages  that contain regular sugar and corn syrup are the main sources of added sugars.  Eating as little of these foods as you can is best.   One shocking example of the epidemic of added sugar is soda.    One can of regular soda contains about as much added sugar as 3 regular size doughnuts!     The other issue with processed foods is the amount of processed grains they contain , such as white flour.    This is also something you want to try to limit in your diet.     But, grain " products are very important for your nutrition.    Whole grains are better for your body.     Cutting back on white breads, traditional pasta, baked goods, white rice,  and processed cereals will be healthier for you.   The better choices include whole grain breads,  whole wheat pasta,  brown rice, quinoa, barley, steel cut  or rolled oats.   If you eat cereal for breakfast, try to look for one made with whole grains and less sugar.   There are many people who have a problem with gluten, for a large variety of reasons.    Generally,  products made with wheat flour , barley or rye are the primary source of gluten.       Cutting back on saturated fats is important.    You want the majority of the meat that you eat to be chicken, fish or turkey.   Baked or broiled is best -  fried adds too much fat.    There are healthy fats that are important - fat is important for holding down appetite, vitamin absorption and several metabolic processes in the body.  Monounsaturated fats raise HDL (good cholesterol) and lower LDL (bad cholesterol).   Olive oil, peanut oil, nuts, seeds, and avocados are great sources of the good fats.       Ideas are:   Trade sour cream dip for hummus (which is rich in olive oil) or guacamole; use veggies or whole-wheat chips to dip.    Nuts are an excellent source of protein and healthy fats.   Tree nuts are the best kind, such as almonds or walnuts.   Just be careful - they are high in calories, so stick to a serving size.  (Most are about 200 calories for a 1/4 cup)      Proteins are very important for your body, and they also hold down your appetite.   Try to have protein with every meal.    These generally are meats, nuts, many beans, legumes, eggs, and dairy.   You will find protein in whole grain products and some green vegetables have a little too.     When you have dairy (if you can - many people are lactose intolerant) try to make it low fat.    Ideas are 1% milk, lowfat yogurt or cheeses,  "low fat cottage cheese.   I don't generally recommend FAT FREE because they often contain artificial products to improve taste, and the fat helps hold down your appetite.   If you are lactose intolerant, try to see if taking Lactaid before having dairy helps.      Fresh fruits and vegetables are VERY important.  The brighter the better.   Many vegetables are considered \"Free Foods\" - meaning you can eat as much as you want, and it does not matter.  These include tomatoes, cucumbers, celery, peppers, all the various lettuces and kale - to name a few.   Potatoes, corn and peas are starchy, so do have more calories, but are still healthy - you just want to watch the amount of them you eat.       Fruits are full of wonderful nutrition.   They contain natural sugar called fructose, so eating them in moderation is best.   Diabetics may need to pay careful attention to how their body reacts to the sugar.  Some fruits might drastically increase their blood sugar.      Eating smaller meals with a couple of small snacks is better for your metabolism than not eating for long amounts of time  (breakfast is very important).   Trying to avoid large meals is helpful too.    Eating like this helps keep your appetite down and keeps you in burning metabolism rather than storage metabolism so your body will use the calories you eat.       I do not tell people to stop eating sweets or snack foods - just limit the amounts you have.  The less the better.   Pay attention to serving sizes, and treat them as a treat.        Foods like doughnuts, pop tarts, sugar cereals, cookies  ARE NOT GOOD FOR BREAKFAST.   They are loaded with sugar and will cause you to be hungrier in the day and often not feel well.    Caffeine needs to be limited - no more than 2 servings a day.  Some people can't have any at all.    (if you have any sleep or anxiety issues - stop the caffeine)   Coffee, many teas, many sodas, energy drinks, almost any diet " supplement,  and chocolate all contain caffeine.      Water is important.   For most people, 8   x  8 ounces  a day are needed.  This may vary for some health issues.    If you need to be on a low sodium diet, that means looking at labels and eating only 1000 - 2000 mg of sodium a day.    Calcium intake is important.  3 servings of a high calcium food or drink a day is recommended.   This is usually a cup of milk, a cup of yogurt, an ounce of a hard cheese or 1.5 ounces of a soft cheese are the usual servings.   There are other high calcium foods - including soy or almond milk, broccoli,  almonds, dark green leafy vegetable.   Make sure you are not getting more than 1000  - 1200 mg of total calcium a day (unless you have been told you need more by a doctor).    Vitamin D 3 is important to absorb the calcium and for your immune system.   For children, 400 IU a day is recommended.   For adults - 800 - 5000 IU a day  is recommended.  (Often the amount needed is individualized for adults - be sure to ask how much is right for you)    Physical activity is very important for good health.    Finding activities that give you regular exercise is very important for good health.  Try to find exercise you enjoy doing on a regular basis.    30 minutes at least 5 days a week of a good cardiovascular exercise is recommended.   That means something that gets your heart rate going faster than your usual baseline and you can find yourself breathing harder than usual while you are exercising.  If you have not done any exercise in a long time,  make sure you ask if its safe for you to start,  and be sure to gradually work up to your goal.      If you need to lose weight,  following these recommendations will help you.   And if you are doing all of this and still not losing weight, then its likely just the amount of food you are eating.   Learn to cut back on portion sizes.  Using smaller plates may help.  Healthy weight loss is  only  about a pound a week.   You have to remember that whatever you do to lose the weight, you must be prepared to keep it up for life for the weight to stay off.     A lot of people have a lot of luck with using something like a fit bit,  or a program where you keep track of all of your calories that you eat and what you burn off in the day.

## 2024-04-11 ENCOUNTER — HOSPITAL ENCOUNTER (OUTPATIENT)
Dept: RADIOLOGY | Facility: HOSPITAL | Age: 79
Discharge: HOME | End: 2024-04-11
Payer: MEDICARE

## 2024-04-11 DIAGNOSIS — Z78.0 POSTMENOPAUSAL ESTROGEN DEFICIENCY: ICD-10-CM

## 2024-04-11 PROCEDURE — 77080 DXA BONE DENSITY AXIAL: CPT

## 2024-07-05 DIAGNOSIS — F32.5 DEPRESSION, MAJOR, IN REMISSION (CMS-HCC): ICD-10-CM

## 2024-07-05 RX ORDER — BUPROPION HYDROCHLORIDE 300 MG/1
300 TABLET ORAL DAILY
Qty: 90 TABLET | Refills: 1 | Status: SHIPPED | OUTPATIENT
Start: 2024-07-05

## 2024-07-11 ENCOUNTER — APPOINTMENT (OUTPATIENT)
Dept: PRIMARY CARE | Facility: CLINIC | Age: 79
End: 2024-07-11
Payer: MEDICARE

## 2024-07-17 ENCOUNTER — APPOINTMENT (OUTPATIENT)
Dept: PRIMARY CARE | Facility: CLINIC | Age: 79
End: 2024-07-17
Payer: MEDICARE

## 2024-07-17 VITALS
HEART RATE: 82 BPM | DIASTOLIC BLOOD PRESSURE: 80 MMHG | OXYGEN SATURATION: 98 % | BODY MASS INDEX: 24.24 KG/M2 | SYSTOLIC BLOOD PRESSURE: 128 MMHG | WEIGHT: 118 LBS

## 2024-07-17 DIAGNOSIS — F32.5 DEPRESSION, MAJOR, IN REMISSION (CMS-HCC): ICD-10-CM

## 2024-07-17 DIAGNOSIS — Z12.31 SCREENING MAMMOGRAM, ENCOUNTER FOR: ICD-10-CM

## 2024-07-17 DIAGNOSIS — I10 BENIGN ESSENTIAL HYPERTENSION: ICD-10-CM

## 2024-07-17 DIAGNOSIS — Z12.11 SCREEN FOR COLON CANCER: Primary | ICD-10-CM

## 2024-07-17 DIAGNOSIS — K21.9 GASTROESOPHAGEAL REFLUX DISEASE WITHOUT ESOPHAGITIS: ICD-10-CM

## 2024-07-17 DIAGNOSIS — E78.00 ELEVATED LDL CHOLESTEROL LEVEL: ICD-10-CM

## 2024-07-17 DIAGNOSIS — J44.9 CHRONIC OBSTRUCTIVE PULMONARY DISEASE, UNSPECIFIED COPD TYPE (MULTI): ICD-10-CM

## 2024-07-17 LAB
ANION GAP SERPL CALC-SCNC: 15 MMOL/L (ref 10–20)
BASOPHILS # BLD AUTO: 0.03 X10*3/UL (ref 0–0.1)
BASOPHILS NFR BLD AUTO: 0.6 %
BUN SERPL-MCNC: 13 MG/DL (ref 6–23)
CALCIUM SERPL-MCNC: 9.3 MG/DL (ref 8.6–10.3)
CHLORIDE SERPL-SCNC: 106 MMOL/L (ref 98–107)
CO2 SERPL-SCNC: 25 MMOL/L (ref 21–32)
CREAT SERPL-MCNC: 0.98 MG/DL (ref 0.5–1.05)
EGFRCR SERPLBLD CKD-EPI 2021: 60 ML/MIN/1.73M*2
EOSINOPHIL # BLD AUTO: 0.16 X10*3/UL (ref 0–0.4)
EOSINOPHIL NFR BLD AUTO: 3.1 %
ERYTHROCYTE [DISTWIDTH] IN BLOOD BY AUTOMATED COUNT: 15.9 % (ref 11.5–14.5)
GLUCOSE SERPL-MCNC: 79 MG/DL (ref 74–99)
HCT VFR BLD AUTO: 35.5 % (ref 36–46)
HGB BLD-MCNC: 11.2 G/DL (ref 12–16)
IMM GRANULOCYTES # BLD AUTO: 0.01 X10*3/UL (ref 0–0.5)
IMM GRANULOCYTES NFR BLD AUTO: 0.2 % (ref 0–0.9)
LYMPHOCYTES # BLD AUTO: 1.46 X10*3/UL (ref 0.8–3)
LYMPHOCYTES NFR BLD AUTO: 28.2 %
MCH RBC QN AUTO: 30.1 PG (ref 26–34)
MCHC RBC AUTO-ENTMCNC: 31.5 G/DL (ref 32–36)
MCV RBC AUTO: 95 FL (ref 80–100)
MONOCYTES # BLD AUTO: 0.4 X10*3/UL (ref 0.05–0.8)
MONOCYTES NFR BLD AUTO: 7.7 %
NEUTROPHILS # BLD AUTO: 3.12 X10*3/UL (ref 1.6–5.5)
NEUTROPHILS NFR BLD AUTO: 60.2 %
NRBC BLD-RTO: 0 /100 WBCS (ref 0–0)
PLATELET # BLD AUTO: 259 X10*3/UL (ref 150–450)
POTASSIUM SERPL-SCNC: 4.6 MMOL/L (ref 3.5–5.3)
RBC # BLD AUTO: 3.72 X10*6/UL (ref 4–5.2)
SODIUM SERPL-SCNC: 141 MMOL/L (ref 136–145)
WBC # BLD AUTO: 5.2 X10*3/UL (ref 4.4–11.3)

## 2024-07-17 PROCEDURE — 1160F RVW MEDS BY RX/DR IN RCRD: CPT | Performed by: FAMILY MEDICINE

## 2024-07-17 PROCEDURE — 3074F SYST BP LT 130 MM HG: CPT | Performed by: FAMILY MEDICINE

## 2024-07-17 PROCEDURE — 1036F TOBACCO NON-USER: CPT | Performed by: FAMILY MEDICINE

## 2024-07-17 PROCEDURE — 36415 COLL VENOUS BLD VENIPUNCTURE: CPT

## 2024-07-17 PROCEDURE — 1159F MED LIST DOCD IN RCRD: CPT | Performed by: FAMILY MEDICINE

## 2024-07-17 PROCEDURE — 1157F ADVNC CARE PLAN IN RCRD: CPT | Performed by: FAMILY MEDICINE

## 2024-07-17 PROCEDURE — 99214 OFFICE O/P EST MOD 30 MIN: CPT | Performed by: FAMILY MEDICINE

## 2024-07-17 PROCEDURE — 85025 COMPLETE CBC W/AUTO DIFF WBC: CPT

## 2024-07-17 PROCEDURE — 3079F DIAST BP 80-89 MM HG: CPT | Performed by: FAMILY MEDICINE

## 2024-07-17 PROCEDURE — 80048 BASIC METABOLIC PNL TOTAL CA: CPT

## 2024-07-17 PROCEDURE — 1123F ACP DISCUSS/DSCN MKR DOCD: CPT | Performed by: FAMILY MEDICINE

## 2024-07-17 RX ORDER — OMEPRAZOLE 20 MG/1
20 TABLET, DELAYED RELEASE ORAL
Qty: 90 TABLET | Refills: 3 | Status: SHIPPED | OUTPATIENT
Start: 2024-07-17

## 2024-07-17 RX ORDER — LISINOPRIL 10 MG/1
10 TABLET ORAL DAILY
Qty: 90 TABLET | Refills: 3 | Status: SHIPPED | OUTPATIENT
Start: 2024-07-17

## 2024-07-17 RX ORDER — ATORVASTATIN CALCIUM 80 MG/1
80 TABLET, FILM COATED ORAL NIGHTLY
Qty: 90 TABLET | Refills: 1 | Status: SHIPPED | OUTPATIENT
Start: 2024-07-17

## 2024-07-17 RX ORDER — ESCITALOPRAM OXALATE 20 MG/1
20 TABLET ORAL DAILY
Qty: 90 TABLET | Refills: 3 | Status: SHIPPED | OUTPATIENT
Start: 2024-07-17

## 2024-07-17 RX ORDER — AMLODIPINE BESYLATE 5 MG/1
5 TABLET ORAL DAILY
Qty: 90 TABLET | Refills: 1 | Status: SHIPPED | OUTPATIENT
Start: 2024-07-17

## 2024-07-17 RX ORDER — ALBUTEROL SULFATE 90 UG/1
2 AEROSOL, METERED RESPIRATORY (INHALATION) EVERY 4 HOURS PRN
Qty: 54 G | Refills: 3 | Status: SHIPPED | OUTPATIENT
Start: 2024-07-17

## 2024-07-17 NOTE — ASSESSMENT & PLAN NOTE
Orders:    albuterol 90 mcg/actuation inhaler; Inhale 2 puffs every 4 hours if needed for wheezing.

## 2024-07-17 NOTE — PROGRESS NOTES
"Subjective   Patient ID: Paz Farmer is a 78 y.o. female who presents for Follow-up.    HPI     LOV   1/2024 -   Medicare Wellness and follow up     Updates and concerns:        Feels very good!     Would like to go back on omeprazole               Chronic issues and meds:      HTN -   amlodipine 5 mg daily;   lisinopril 10 mg a day;   BPs are running well    Stress test 5/2018 WNL, EF 60%   2010 - Coronary Cath done - clear but a small rt cor artery      Prediabetes: Last A1C - 1/2023 -  5.7%   Nutrition: has been eating better ; KEEPING WT OFF      Upper teeth have been removed - back to wearing her old upper dentures        COPD -   TRELEGY QD  - has a lot on hand   Proair prn - WORKING GREAT      Hyperchol - atorvastatin 80 mg a day      Headaches -  off topirimate -   Headaches are better when she wears her teeth      Did have to have shunt revision with DR Ibarra - 12/27/22  \"Right Ventriculoperitoneal Shunt Valve Exchange\"   Was having intracranial hypotension - severe headaches -   Head feels great.   No headaches.    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      Depression - Bupropion  mg a day, escitalopram 20 mg a day - Mood is doing much better      LABS JULY 2021 - Renal function mildly low -   and HBG mildly low - stool cards neg      Has hearing issues - has OTC hearing aids -   Does not want audiology eval        MCR wellness 1/2024  MCR WWE in 10/2020   Last Mamm WNL  5/2023 -  needs order          No breast concerns   No longer needs paps      DEXA osteopenia   4/2024  - osteopenia        Last colonoscopy - 2002 ([facility]Bl[facility])  COLOGUARD - NEG 11/2020        Stool cards neg 2021         Willing to do cologuard again           Living will and Medical POA -           Living will on chart - not medical POA            Who is her DTR Lucretia Price is her significant " other - they have been together 35 years         Vaccines -   FLU vacc:   did not get yet   Has had both pneumonia shots;  last one 2017  Prev 20   done   1/11/24  SHINGRIX - HAD BOTH 2019   COVID - AUG and Sept 2021 - does not want booster   Tdap -   RSV - not yet        Review of Systems    Objective   /80 (BP Location: Right arm, Patient Position: Sitting, BP Cuff Size: Adult)   Pulse 82   Wt 53.5 kg (118 lb)   SpO2 98%   BMI 24.24 kg/m²     Physical Exam  Vitals reviewed.   Constitutional:       General: She is not in acute distress.     Appearance: Normal appearance.   HENT:      Head: Normocephalic and atraumatic.      Nose: Nose normal.      Mouth/Throat:      Mouth: Mucous membranes are moist.      Pharynx: No posterior oropharyngeal erythema.   Eyes:      Extraocular Movements: Extraocular movements intact.      Conjunctiva/sclera: Conjunctivae normal.      Pupils: Pupils are equal, round, and reactive to light.   Cardiovascular:      Rate and Rhythm: Normal rate and regular rhythm.      Heart sounds: Normal heart sounds. No murmur heard.  Pulmonary:      Effort: Pulmonary effort is normal. No respiratory distress.      Breath sounds: Normal breath sounds. No wheezing.   Musculoskeletal:      Cervical back: No rigidity.   Lymphadenopathy:      Cervical: No cervical adenopathy.   Skin:     General: Skin is warm and dry.      Findings: No rash.   Neurological:      General: No focal deficit present.      Mental Status: She is alert. Mental status is at baseline.   Psychiatric:         Mood and Affect: Mood normal.         Thought Content: Thought content normal.         Assessment/Plan   Assessment & Plan  Screen for colon cancer    Orders:    Cologuard® colon cancer screening; Future    Cologuard® colon cancer screening    Chronic obstructive pulmonary disease, unspecified COPD type (Multi)    Orders:    albuterol 90 mcg/actuation inhaler; Inhale 2 puffs every 4 hours if needed for  wheezing.    Benign essential hypertension    Orders:    amLODIPine (Norvasc) 5 mg tablet; Take 1 tablet (5 mg) by mouth once daily.    lisinopril 10 mg tablet; Take 1 tablet (10 mg) by mouth once daily.    Basic Metabolic Panel    CBC and Auto Differential    Elevated LDL cholesterol level    Orders:    atorvastatin (Lipitor) 80 mg tablet; Take 1 tablet (80 mg) by mouth once daily at bedtime.    Depression, major, in remission (CMS-HCC)    Orders:    escitalopram (Lexapro) 20 mg tablet; Take 1 tablet (20 mg) by mouth once daily.    Screening mammogram, encounter for    Orders:    BI mammo bilateral screening tomosynthesis; Future    Gastroesophageal reflux disease without esophagitis    Orders:    omeprazole OTC (PriLOSEC OTC) 20 mg EC tablet; Take 1 tablet (20 mg) by mouth once daily in the morning. Take before meals. Do not crush, chew, or split.      Doing excellent - no changes today   Just checking lytes and CBC     We discussed at visit any disease processes that were of concern as well as the risks, benefits and instructions of any new medication provided.    See orders and discussion section for information handed to patient on their Clinical Summary.   Patient (and/or caretaker of patient if present)  stated all questions were answered, and they voiced understanding of instructions.

## 2024-07-17 NOTE — PATIENT INSTRUCTIONS
Plan an RSV vaccine anytime - at the pharmacy   Plan the flu shot in the fall, and think about a covid booster too     Call [phone] to set up mammogram       You should hear from [facility] within a week  - if not  -   Give them a call  [phone]         Hypertension Reminders:    IF YOU ARE A PERSON WHOSE BLOOD PRESSURE RUNS HIGH IN THE DOCTOR'S OFFICE,  THEN WE NEED TO VERIFY YOUR CUFF AT LEAST  ONCE YEARLY.   ALWAYS BRING CUFF WITH YOU TO ANY HYPERTENSION CHECK UP APPOINTMENT.  WE CAN RECORD YOUR BP  FROM HOME THE DAY OF THE APPOINTMENT - BUT WE HAVE TO SEE IT ON YOUR MACHINE.      To accurately check your blood pressure -  be sure to sit and relax for 5 minutes, you need your back supported, feet flat on the ground, arm heart level and relaxed.    Generally speaking, well controlled hypertension is below 130/80   for  most people  and if you are over 75, below 140/90  is acceptable.    Please take your medication as directed, and if you forget a dose  DO NOT DOUBLE THE DOSE THE NEXT DAY, just take is as you normally would.     It is important to stay on a low sodium diet :  1500 - 2000 mg of sodium a day  -  it is important to read labels.    Regular Exercise is very important as well.  Always gradually increase your exercise regimen.  Your goal is 30 minutes of a good cardiovascular exercise at least 5 days a week.    IF YOUR BMI (BODY MASS INDEX) IS OVER 25, LOSING WEIGHT WILL HELP CONTROL YOUR BLOOD PRESSURE.   Talk to me further if you need help doing this.        It is very important NOT to smoke  or use any tobacco products.  Talk to me about options if you want help quitting.    It is very important to keep your alcohol in take low.   Generally speaking, adult men should not drink more than 2 regular size beers a day, or no more than 2 ounces of liquor, or no more than 12 ounces of wine.  For adult women - the recommendations are half that.    BUT , THIS IS NOT UNIVERSAL   - be sure to ask  "me if alcohol is safe for you to drink, and if so, the acceptable amount.          For General Healthy Nutrition    (Remember - NOT A DIET!   Diets are only good for class reunions.)    These are my general good nutrition recommendations for most people.   I use the term \" diet \"  in these instructions to mean your overall nutrition - how you eat and drink.   If we talked about something different during your visit with me,  other than what is written below,  follow that advice instead.       For most people,  eating healthier means getting less added sugar and less processed foods in your diet    The fresher the better.    Added sugar is now a part of the nutrition label on manufactured food, so you can keep an eye on it easier.    But basically,  foods and beverages  that contain regular sugar and corn syrup are the main sources of added sugars.  Eating as little of these foods as you can is best.   One shocking example of the epidemic of added sugar is soda.    One can of regular soda contains about as much added sugar as 3 regular size doughnuts!     The other issue with processed foods is the amount of processed grains they contain , such as white flour.    This is also something you want to try to limit in your diet.     But, grain products are very important for your nutrition.    Whole grains are better for your body.     Cutting back on white breads, traditional pasta, baked goods, white rice,  and processed cereals will be healthier for you.   The better choices include whole grain breads,  whole wheat pasta,  brown rice, quinoa, barley, steel cut  or rolled oats.   If you eat cereal for breakfast, try to look for one made with whole grains and less sugar.   There are many people who have a problem with gluten, for a large variety of reasons.    Generally,  products made with wheat flour , barley or rye are the primary source of gluten.       Cutting back on saturated fats is important.    You want the " "majority of the meat that you eat to be chicken, fish or turkey.   Baked or broiled is best -  fried adds too much fat.    There are healthy fats that are important - fat is important for holding down appetite, vitamin absorption and several metabolic processes in the body.  Monounsaturated fats raise HDL (good cholesterol) and lower LDL (bad cholesterol).   Olive oil, peanut oil, nuts, seeds, and avocados are great sources of the good fats.       Ideas are:   Trade sour cream dip for hummus (which is rich in olive oil) or guacamole; use veggies or whole-wheat chips to dip.    Nuts are an excellent source of protein and healthy fats.   Tree nuts are the best kind, such as almonds or walnuts.   Just be careful - they are high in calories, so stick to a serving size.  (Most are about 200 calories for a 1/4 cup)      Proteins are very important for your body, and they also hold down your appetite.   Try to have protein with every meal.    These generally are meats, nuts, many beans, legumes, eggs, and dairy.   You will find protein in whole grain products and some green vegetables have a little too.     When you have dairy (if you can - many people are lactose intolerant) try to make it low fat.    Ideas are 1% milk, lowfat yogurt or cheeses, low fat cottage cheese.   I don't generally recommend FAT FREE because they often contain artificial products to improve taste, and the fat helps hold down your appetite.   If you are lactose intolerant, try to see if taking Lactaid before having dairy helps.      Fresh fruits and vegetables are VERY important.  The brighter the better.   Many vegetables are considered \"Free Foods\" - meaning you can eat as much as you want, and it does not matter.  These include tomatoes, cucumbers, celery, peppers, all the various lettuces and kale - to name a few.   Potatoes, corn and peas are starchy, so do have more calories, but are still healthy - you just want to watch the amount of them " you eat.       Fruits are full of wonderful nutrition.   They contain natural sugar called fructose, so eating them in moderation is best.   Diabetics may need to pay careful attention to how their body reacts to the sugar.  Some fruits might drastically increase their blood sugar.      Eating smaller meals with a couple of small snacks is better for your metabolism than not eating for long amounts of time  (breakfast is very important).   Trying to avoid large meals is helpful too.    Eating like this helps keep your appetite down and keeps you in burning metabolism rather than storage metabolism so your body will use the calories you eat.       I do not tell people to stop eating sweets or snack foods - just limit the amounts you have.  The less the better.   Pay attention to serving sizes, and treat them as a treat.        Foods like doughnuts, pop tarts, sugar cereals, cookies  ARE NOT GOOD FOR BREAKFAST.   They are loaded with sugar and will cause you to be hungrier in the day and often not feel well.    Caffeine needs to be limited - no more than 2 servings a day.  Some people can't have any at all.    (if you have any sleep or anxiety issues - stop the caffeine)   Coffee, many teas, many sodas, energy drinks, almost any diet supplement,  and chocolate all contain caffeine.      Water is important.   For most people, 8   x  8 ounces  a day are needed.  This may vary for some health issues.    If you need to be on a low sodium diet, that means looking at labels and eating only 1000 - 2000 mg of sodium a day.    Calcium intake is important.  3 servings of a high calcium food or drink a day is recommended.   This is usually a cup of milk, a cup of yogurt, an ounce of a hard cheese or 1.5 ounces of a soft cheese are the usual servings.   There are other high calcium foods - including soy or almond milk, broccoli,  almonds, dark green leafy vegetable.   Make sure you are not getting more than 1000  - 1200 mg of  total calcium a day (unless you have been told you need more by a doctor).    Vitamin D 3 is important to absorb the calcium and for your immune system.   For children, 400 IU a day is recommended.   For adults - 800 - 5000 IU a day  is recommended.  (Often the amount needed is individualized for adults - be sure to ask how much is right for you)    Physical activity is very important for good health.    Finding activities that give you regular exercise is very important for good health.  Try to find exercise you enjoy doing on a regular basis.    30 minutes at least 5 days a week of a good cardiovascular exercise is recommended.   That means something that gets your heart rate going faster than your usual baseline and you can find yourself breathing harder than usual while you are exercising.  If you have not done any exercise in a long time,  make sure you ask if its safe for you to start,  and be sure to gradually work up to your goal.      If you need to lose weight,  following these recommendations will help you.   And if you are doing all of this and still not losing weight, then its likely just the amount of food you are eating.   Learn to cut back on portion sizes.  Using smaller plates may help.  Healthy weight loss is  only about a pound a week.   You have to remember that whatever you do to lose the weight, you must be prepared to keep it up for life for the weight to stay off.     A lot of people have a lot of luck with using something like a fit bit,  or a program where you keep track of all of your calories that you eat and what you burn off in the day.

## 2024-07-17 NOTE — ASSESSMENT & PLAN NOTE
Orders:    amLODIPine (Norvasc) 5 mg tablet; Take 1 tablet (5 mg) by mouth once daily.    lisinopril 10 mg tablet; Take 1 tablet (10 mg) by mouth once daily.    Basic Metabolic Panel    CBC and Auto Differential

## 2024-07-17 NOTE — ASSESSMENT & PLAN NOTE
Orders:    atorvastatin (Lipitor) 80 mg tablet; Take 1 tablet (80 mg) by mouth once daily at bedtime.

## 2024-07-19 ENCOUNTER — PATIENT MESSAGE (OUTPATIENT)
Dept: PRIMARY CARE | Facility: CLINIC | Age: 79
End: 2024-07-19
Payer: MEDICARE

## 2024-07-19 DIAGNOSIS — K21.9 GASTROESOPHAGEAL REFLUX DISEASE WITHOUT ESOPHAGITIS: Primary | ICD-10-CM

## 2024-07-19 RX ORDER — OMEPRAZOLE 20 MG/1
20 CAPSULE, DELAYED RELEASE ORAL DAILY
Qty: 90 CAPSULE | Refills: 3 | Status: SHIPPED | OUTPATIENT
Start: 2024-07-19 | End: 2025-07-19

## 2024-07-26 ENCOUNTER — HOSPITAL ENCOUNTER (OUTPATIENT)
Dept: RADIOLOGY | Facility: HOSPITAL | Age: 79
Discharge: HOME | End: 2024-07-26
Payer: MEDICARE

## 2024-07-26 VITALS — HEIGHT: 58 IN | BODY MASS INDEX: 24.35 KG/M2 | WEIGHT: 116 LBS

## 2024-07-26 DIAGNOSIS — Z12.31 SCREENING MAMMOGRAM, ENCOUNTER FOR: ICD-10-CM

## 2024-07-26 PROCEDURE — 77067 SCR MAMMO BI INCL CAD: CPT | Performed by: RADIOLOGY

## 2024-07-26 PROCEDURE — 77067 SCR MAMMO BI INCL CAD: CPT

## 2024-07-26 PROCEDURE — 77063 BREAST TOMOSYNTHESIS BI: CPT | Performed by: RADIOLOGY

## 2024-07-28 DIAGNOSIS — R19.5 POSITIVE COLORECTAL CANCER SCREENING USING COLOGUARD TEST: Primary | ICD-10-CM

## 2024-07-28 LAB — NONINV COLON CA DNA+OCC BLD SCRN STL QL: POSITIVE

## 2024-08-15 ENCOUNTER — ANESTHESIA EVENT (OUTPATIENT)
Dept: OPERATING ROOM | Facility: HOSPITAL | Age: 79
End: 2024-08-15
Payer: MEDICARE

## 2024-08-15 RX ORDER — ONDANSETRON HYDROCHLORIDE 2 MG/ML
8 INJECTION, SOLUTION INTRAVENOUS ONCE
Status: CANCELLED | OUTPATIENT
Start: 2024-08-15 | End: 2024-08-15

## 2024-08-15 RX ORDER — ACETAMINOPHEN 325 MG/1
650 TABLET ORAL EVERY 4 HOURS PRN
Status: CANCELLED | OUTPATIENT
Start: 2024-08-15

## 2024-08-15 RX ORDER — ALBUTEROL SULFATE 0.83 MG/ML
2.5 SOLUTION RESPIRATORY (INHALATION) ONCE AS NEEDED
Status: CANCELLED | OUTPATIENT
Start: 2024-08-15

## 2024-08-16 ENCOUNTER — HOSPITAL ENCOUNTER (OUTPATIENT)
Dept: OPERATING ROOM | Facility: HOSPITAL | Age: 79
Setting detail: OUTPATIENT SURGERY
Discharge: HOME | End: 2024-08-16
Payer: MEDICARE

## 2024-08-16 ENCOUNTER — ANESTHESIA (OUTPATIENT)
Dept: OPERATING ROOM | Facility: HOSPITAL | Age: 79
End: 2024-08-16
Payer: MEDICARE

## 2024-08-16 VITALS
DIASTOLIC BLOOD PRESSURE: 72 MMHG | TEMPERATURE: 97.9 F | BODY MASS INDEX: 22.94 KG/M2 | RESPIRATION RATE: 15 BRPM | OXYGEN SATURATION: 98 % | HEART RATE: 71 BPM | WEIGHT: 116.84 LBS | SYSTOLIC BLOOD PRESSURE: 119 MMHG | HEIGHT: 60 IN

## 2024-08-16 DIAGNOSIS — R19.5 POSITIVE COLORECTAL CANCER SCREENING USING COLOGUARD TEST: ICD-10-CM

## 2024-08-16 PROCEDURE — 3600000007 HC OR TIME - EACH INCREMENTAL 1 MINUTE - PROCEDURE LEVEL TWO: Performed by: ANESTHESIOLOGY

## 2024-08-16 PROCEDURE — 7100000010 HC PHASE TWO TIME - EACH INCREMENTAL 1 MINUTE: Performed by: ANESTHESIOLOGY

## 2024-08-16 PROCEDURE — G0105 COLORECTAL SCRN; HI RISK IND: HCPCS | Performed by: SURGERY

## 2024-08-16 PROCEDURE — 3600000002 HC OR TIME - INITIAL BASE CHARGE - PROCEDURE LEVEL TWO: Performed by: ANESTHESIOLOGY

## 2024-08-16 PROCEDURE — 2500000005 HC RX 250 GENERAL PHARMACY W/O HCPCS: Performed by: NURSE ANESTHETIST, CERTIFIED REGISTERED

## 2024-08-16 PROCEDURE — 2500000004 HC RX 250 GENERAL PHARMACY W/ HCPCS (ALT 636 FOR OP/ED): Performed by: NURSE ANESTHETIST, CERTIFIED REGISTERED

## 2024-08-16 PROCEDURE — 2500000004 HC RX 250 GENERAL PHARMACY W/ HCPCS (ALT 636 FOR OP/ED): Performed by: ANESTHESIOLOGY

## 2024-08-16 PROCEDURE — 3700000002 HC GENERAL ANESTHESIA TIME - EACH INCREMENTAL 1 MINUTE: Performed by: ANESTHESIOLOGY

## 2024-08-16 PROCEDURE — 7100000009 HC PHASE TWO TIME - INITIAL BASE CHARGE: Performed by: ANESTHESIOLOGY

## 2024-08-16 PROCEDURE — 3700000001 HC GENERAL ANESTHESIA TIME - INITIAL BASE CHARGE: Performed by: ANESTHESIOLOGY

## 2024-08-16 RX ORDER — LIDOCAINE HCL/PF 100 MG/5ML
SYRINGE (ML) INTRAVENOUS AS NEEDED
Status: DISCONTINUED | OUTPATIENT
Start: 2024-08-16 | End: 2024-08-16

## 2024-08-16 RX ORDER — PHENYLEPHRINE HYDROCHLORIDE 10 MG/ML
INJECTION INTRAVENOUS AS NEEDED
Status: DISCONTINUED | OUTPATIENT
Start: 2024-08-16 | End: 2024-08-16

## 2024-08-16 RX ORDER — SODIUM CHLORIDE, SODIUM LACTATE, POTASSIUM CHLORIDE, CALCIUM CHLORIDE 600; 310; 30; 20 MG/100ML; MG/100ML; MG/100ML; MG/100ML
100 INJECTION, SOLUTION INTRAVENOUS CONTINUOUS
Status: DISCONTINUED | OUTPATIENT
Start: 2024-08-16 | End: 2024-08-17 | Stop reason: HOSPADM

## 2024-08-16 RX ORDER — FENTANYL CITRATE 50 UG/ML
INJECTION, SOLUTION INTRAMUSCULAR; INTRAVENOUS AS NEEDED
Status: DISCONTINUED | OUTPATIENT
Start: 2024-08-16 | End: 2024-08-16

## 2024-08-16 RX ORDER — PROPOFOL 10 MG/ML
INJECTION, EMULSION INTRAVENOUS AS NEEDED
Status: DISCONTINUED | OUTPATIENT
Start: 2024-08-16 | End: 2024-08-16

## 2024-08-16 SDOH — HEALTH STABILITY: MENTAL HEALTH: CURRENT SMOKER: 0

## 2024-08-16 ASSESSMENT — PAIN SCALES - GENERAL
PAINLEVEL_OUTOF10: 0 - NO PAIN
PAIN_LEVEL: 0
PAINLEVEL_OUTOF10: 0 - NO PAIN

## 2024-08-16 ASSESSMENT — PAIN - FUNCTIONAL ASSESSMENT: PAIN_FUNCTIONAL_ASSESSMENT: 0-10

## 2024-08-16 NOTE — ANESTHESIA PREPROCEDURE EVALUATION
Patient: Paz Farmer    Procedure Information       Date/Time: 24 1010    Scheduled providers: Domi Ashley MD; Danial Cheung MD    Procedure: COLONOSCOPY    Location: [facility] OR          There were no vitals filed for this visit.    Past Surgical History:   Procedure Laterality Date    APPENDECTOMY  2013    Appendectomy    CARDIAC CATHETERIZATION  10/30/2014    Cardiac Cath Procedure Summary     SECTION, LOW TRANSVERSE  2013     Section Low Transverse    COLONOSCOPY  2013    Complete Colonoscopy    CYSTOSCOPY  2013    Diagnostic Cystoscopy    FOOT SURGERY  2013    Foot Surgery    MR HEAD ANGIO WO IV CONTRAST  2015    MR HEAD ANGIO WO IV CONTRAST 2015 [facility]    OTHER SURGICAL HISTORY  2022    Laparoscopy    OTHER SURGICAL HISTORY  2022    Hernia repair    OTHER SURGICAL HISTORY  2013    Ear Surgery    OTHER SURGICAL HISTORY  2022    Brain Surgery    OTHER SURGICAL HISTORY  2013    Ventricular Shunt    OTHER SURGICAL HISTORY  2013    Arthroscopy Shoulder Right    STOMACH SURGERY  2013    Gastric Surgery    TOTAL SHOULDER ARTHROPLASTY  2019    Shoulder Arthroplasty Total Shoulder Replacement     Past Medical History:   Diagnosis Date    Body mass index (BMI) 27.0-27.9, adult 2021    Body mass index (BMI) of 27.0 to 27.9 in adult    Body mass index (BMI) 28.0-28.9, adult 10/13/2020    Body mass index (BMI) of 28.0 to 28.9 in adult    Cutaneous abscess of unspecified hand 2014    Abscess of finger    Disease of jaws, unspecified 2016    Disorder of jaw    Dysuria 2013    Dysuria    Encounter for follow-up examination after completed treatment for conditions other than malignant neoplasm 2013    Follow-up exam    Encounter for other preprocedural examination 2014    Pre-op testing    Headache, unspecified 2022    Chronic  intractable headache, unspecified headache type    Migraine, unspecified, not intractable, without status migrainosus 12/02/2022    Headache, migraine    Noninfective gastroenteritis and colitis, unspecified     Acute colitis    Other conditions influencing health status 12/11/2020    History of cough    Other conditions influencing health status 11/05/2014    Cyst    Other conditions influencing health status 11/16/2022    Craniotomy (Diagnostic)    Other diseases of salivary glands 09/13/2021    Salivary duct obstruction    Other specified disorders of left ear 11/10/2017    Ear canal mass, left    Personal history of diseases of the blood and blood-forming organs and certain disorders involving the immune mechanism 10/25/2019    History of anemia    Personal history of diseases of the blood and blood-forming organs and certain disorders involving the immune mechanism 08/05/2015    History of anemia    Personal history of other diseases of the digestive system 08/30/2021    History of parotitis    Personal history of other diseases of the musculoskeletal system and connective tissue 10/13/2020    History of bone disorder    Personal history of other endocrine, nutritional and metabolic disease 06/11/2019    History of hypokalemia    Personal history of other specified conditions 01/19/2018    History of gross hematuria    Personal history of other specified conditions     History of chest pain    Personal history of other specified conditions 04/13/2022    History of dysuria    Personal history of urinary (tract) infections 07/22/2013    Personal history of urinary tract infection    Personal history of urinary (tract) infections 04/19/2022    History of urinary tract infection    Right upper quadrant pain 04/04/2022    Abdominal pain, RUQ (right upper quadrant)    Sialolithiasis 08/30/2021    Stone of salivary gland    Tear of left rotator cuff 09/07/2023    Unspecified chronic otitis externa, left ear 07/22/2013     Chronic otitis externa of left ear    Unspecified otitis externa, left ear 01/26/2018    Otitis externa, left    Vomiting, unspecified 04/04/2022    Vomiting and diarrhea       Current Outpatient Medications:     albuterol 90 mcg/actuation inhaler, Inhale 2 puffs every 4 hours if needed for wheezing., Disp: 54 g, Rfl: 3    amLODIPine (Norvasc) 5 mg tablet, Take 1 tablet (5 mg) by mouth once daily., Disp: 90 tablet, Rfl: 1    aspirin 81 mg EC tablet, Take 1 tablet (81 mg) by mouth once daily., Disp: , Rfl:     atorvastatin (Lipitor) 80 mg tablet, Take 1 tablet (80 mg) by mouth once daily at bedtime., Disp: 90 tablet, Rfl: 1    buPROPion XL (Wellbutrin XL) 300 mg 24 hr tablet, Take 1 tablet (300 mg) by mouth once daily. Do not crush, chew, or split., Disp: 90 tablet, Rfl: 1    escitalopram (Lexapro) 20 mg tablet, Take 1 tablet (20 mg) by mouth once daily., Disp: 90 tablet, Rfl: 3    fluticasone-umeclidin-vilanter (Trelegy Ellipta) 100-62.5-25 mcg blister with device, USE 1 INHALATION ORALLY    DAILY (TO REPLACE ADVAIR   AND INCRUSE), Disp: 90 each, Rfl: 3    lisinopril 10 mg tablet, Take 1 tablet (10 mg) by mouth once daily., Disp: 90 tablet, Rfl: 3    omeprazole (PriLOSEC) 20 mg DR capsule, Take 1 capsule (20 mg) by mouth once daily. Do not crush or chew., Disp: 90 capsule, Rfl: 3    omeprazole OTC (PriLOSEC OTC) 20 mg EC tablet, Take 1 tablet (20 mg) by mouth once daily in the morning. Take before meals. Do not crush, chew, or split., Disp: 90 tablet, Rfl: 3  Prior to Admission medications    Medication Sig Start Date End Date Taking? Authorizing Provider   albuterol 90 mcg/actuation inhaler Inhale 2 puffs every 4 hours if needed for wheezing. 7/17/24   Saloni Gregory MD   amLODIPine (Norvasc) 5 mg tablet Take 1 tablet (5 mg) by mouth once daily. 7/17/24   Saloni Gregory MD   aspirin 81 mg EC tablet Take 1 tablet (81 mg) by mouth once daily. 4/26/18   Historical Provider, MD   atorvastatin  (Lipitor) 80 mg tablet Take 1 tablet (80 mg) by mouth once daily at bedtime. 24   Saloni Gregory MD   buPROPion XL (Wellbutrin XL) 300 mg 24 hr tablet Take 1 tablet (300 mg) by mouth once daily. Do not crush, chew, or split. 24   Saloni Gregory MD   escitalopram (Lexapro) 20 mg tablet Take 1 tablet (20 mg) by mouth once daily. 24   Saloni Gregory MD   fluticasone-umeclidin-vilanter (Trelegy Ellipta) 100-62.5-25 mcg blister with device USE 1 INHALATION ORALLY    DAILY (TO REPLACE ADVAIR   AND INCRUSE) 23   Saloni Gregory MD   lisinopril 10 mg tablet Take 1 tablet (10 mg) by mouth once daily. 24   Saloni Gregory MD   omeprazole (PriLOSEC) 20 mg DR capsule Take 1 capsule (20 mg) by mouth once daily. Do not crush or chew. 24  Saloni Gregory MD   omeprazole OTC (PriLOSEC OTC) 20 mg EC tablet Take 1 tablet (20 mg) by mouth once daily in the morning. Take before meals. Do not crush, chew, or split. 24   Saloni Gregory MD     No Known Allergies  Social History     Tobacco Use    Smoking status: Former     Current packs/day: 0.00     Average packs/day: 0.5 packs/day for 4.0 years (2.0 ttl pk-yrs)     Types: Cigarettes     Start date:      Quit date:      Years since quittin.6    Smokeless tobacco: Never   Substance Use Topics    Alcohol use: Never         Chemistry    Lab Results   Component Value Date/Time     2024 1019    K 4.6 2024 1019     2024 1019    CO2 25 2024 1019    BUN 13 2024 1019    CREATININE 0.98 2024 1019    Lab Results   Component Value Date/Time    CALCIUM 9.3 2024 1019    ALKPHOS 61 2024 1033    AST 17 2024 1033    ALT 13 2024 1033    BILITOT 0.8 2024 1033          Lab Results   Component Value Date/Time    WBC 5.2 2024 1019    HGB 11.2 (L) 2024 1019    HCT 35.5 (L) 2024 1019    PLT  259 07/17/2024 1019     Lab Results   Component Value Date/Time    PROTIME 11.1 12/26/2022 1543    INR 1.0 12/26/2022 1543     No results found for this or any previous visit (from the past 4464 hour(s)).  No results found for this or any previous visit from the past 1095 days.      Relevant Problems   Cardiac   (+) Benign essential hypertension   (+) Disorder of mitral valve      Pulmonary   (+) Chronic obstructive pulmonary disease (Multi)   (+) Dyspnea on exertion      Neuro   (+) Depression, major, in remission (CMS-HCC)      Hematology   (+) Anemia due to acute blood loss       Clinical information reviewed:                   NPO Detail:  No data recorded     Physical Exam    Airway  Mallampati: II     Cardiovascular - normal exam     Dental    Pulmonary    Abdominal            Anesthesia Plan    History of general anesthesia?: yes  History of complications of general anesthesia?: no    ASA 2     MAC     The patient is not a current smoker.  Patient was not previously instructed to abstain from smoking on day of procedure.  Patient did not smoke on day of procedure.  Education provided regarding risk of obstructive sleep apnea.  intravenous induction   Anesthetic plan and risks discussed with patient.    Plan discussed with CRNA.

## 2024-08-16 NOTE — DISCHARGE INSTRUCTIONS
Patient Instructions after a Colonoscopy      The anesthetics, sedatives or narcotics which were given to you today will be acting in your body for the next 24 hours, so you might feel a little sleepy or groggy.  This feeling should slowly wear off. Carefully read and follow the instructions.     You received sedation today:  - Do not drive or operate any machinery or power tools of any kind.   - No alcoholic beverages today, not even beer or wine.  - Do not make any important decisions or sign any legal documents.  - No over the counter medications that contain alcohol or that may cause drowsiness.  - Do not make any important decisions or sign any legal documents.  - Make sure you have someone with you for first 24 hours.    While it is common to experience mild to moderate abdominal distention, gas, or belching after your procedure, if any of these symptoms occur following discharge from the GI Lab or within one week of having your procedure, call the [facility] to be advised whether a visit to your nearest Urgent Care or Emergency Department is indicated.  Take this paper with you if you go.     - If you develop an allergic reaction to the medications that were given during your procedure such as difficulty breathing, rash, hives, severe nausea, vomiting or lightheadedness.  - If you experience chest pain, shortness of breath, severe abdominal pain, fevers and chills.  -If you develop signs and symptoms of bleeding such as blood in your spit, if your stools turn black, tarry, or bloody  - If you have not urinated within 8 hours following your procedure.  - If your IV site becomes painful, red, inflamed, or looks infected.    If you received a biopsy/polypectomy/sphincterotomy the following instructions apply below:    __ Do not use Aspirin containing products, non-steroidal medications or anti-coagulants for one week following your procedure. (Examples of these types of medications are: Advil,  Arthrotec, Aleve, Coumadin, Ecotrin, Heparin, Ibuprofen, Indocin, Motrin, Naprosyn, Nuprin, Plavix, Vioxx, and Voltarin, or their generic forms.  This list is not all-inclusive.  Check with your physician or pharmacist before resuming medications.)   __ Eat a soft diet today.  Avoid foods that are poorly digested for the next 24 hours.  These foods would include: nuts, beans, lettuce, red meats, and fried foods. Start with liquids and advance your diet as tolerated, gradually work up to eating solids.   __ Do not have a Barium Study or Enema for one week.    Your physician recommends the additional following instructions:    -You have a contact number available for emergencies. The signs and symptoms of potential delayed complications were discussed with you. You may return to normal activities tomorrow.  -Resume your previous diet.  -Continue your present medications.   -We are waiting for your pathology results.  -Your physician has recommended a repeat colonoscopy (date to be determined after pending pathology results are reviewed) for surveillance based on pathology results.  -The findings and recommendations have been discussed with you.  -The findings and recommendations were discussed with your family.  - Please see Medication Reconciliation Form for new medication/medications prescribed.       If you experience any problems or have any questions following discharge from the GI Lab, please call:        Nurse Signature                                                                        Date___________________                                                                            Patient/Responsible Party Signature                                        Date___________________

## 2024-08-16 NOTE — ANESTHESIA POSTPROCEDURE EVALUATION
Patient: Paz Farmer    Procedure Summary       Date: 08/16/24 Room / Location: [facility] OR    Anesthesia Start: 0947 Anesthesia Stop: 1034    Procedure: COLONOSCOPY Diagnosis: Positive colorectal cancer screening using Cologuard test    Scheduled Providers: Domi Ashley MD; Danial Cheung MD Responsible Provider: Danial Cheung MD    Anesthesia Type: MAC ASA Status: 2            Anesthesia Type: MAC    Vitals Value Taken Time   BP 94/56 08/16/24 1034   Temp 36.5 08/16/24 1034   Pulse 59 08/16/24 1034   Resp 16 08/16/24 1034   SpO2 100 08/16/24 1034       Anesthesia Post Evaluation    Patient location during evaluation: PACU  Patient participation: complete - patient participated  Level of consciousness: awake and alert  Pain score: 0  Pain management: adequate  Airway patency: patent  Two or more strategies used to mitigate risk of obstructive sleep apnea  Cardiovascular status: acceptable and stable  Respiratory status: acceptable and room air  Hydration status: acceptable  Postoperative Nausea and Vomiting: none        There were no known notable events for this encounter.

## 2024-09-11 ENCOUNTER — TELEPHONE (OUTPATIENT)
Dept: PRIMARY CARE | Facility: CLINIC | Age: 79
End: 2024-09-11
Payer: MEDICARE

## 2024-09-11 DIAGNOSIS — R26.89 BALANCE DISORDER: ICD-10-CM

## 2024-09-11 DIAGNOSIS — Z98.2 VENTRICULO-PERITONEAL SHUNT STATUS: Primary | ICD-10-CM

## 2024-09-11 NOTE — TELEPHONE ENCOUNTER
I called pt back -   For 2 days  - she has been feeling drunk -   Staggering with walking -     Has a  shunt -     Does not have headache    Her neurosurgeon retired-     Told her I really think she should go to ER -   She does not want to  -     Will at least put in order for stat CT of head.

## 2024-09-11 NOTE — TELEPHONE ENCOUNTER
I am feeling, what I guess you would call, tipsy.  I walk like I am drunk.  I will wall fine and then start walking way left or way right and sometimes I just can't seem to  a step up.  I feel that if I call the  I won't be able to get in right away.  Is this something that is important enough to see you right away?  Let me know what I should do.

## 2024-09-14 ENCOUNTER — HOSPITAL ENCOUNTER (OUTPATIENT)
Dept: RADIOLOGY | Facility: HOSPITAL | Age: 79
Discharge: HOME | End: 2024-09-14
Payer: MEDICARE

## 2024-09-14 DIAGNOSIS — R26.89 BALANCE DISORDER: ICD-10-CM

## 2024-09-14 DIAGNOSIS — Z98.2 VENTRICULO-PERITONEAL SHUNT STATUS: ICD-10-CM

## 2024-09-14 PROCEDURE — 70450 CT HEAD/BRAIN W/O DYE: CPT | Performed by: RADIOLOGY

## 2024-09-14 PROCEDURE — 70450 CT HEAD/BRAIN W/O DYE: CPT

## 2024-09-20 DIAGNOSIS — J42 CHRONIC BRONCHITIS, UNSPECIFIED CHRONIC BRONCHITIS TYPE (MULTI): ICD-10-CM

## 2024-09-20 RX ORDER — FLUTICASONE FUROATE, UMECLIDINIUM BROMIDE AND VILANTEROL TRIFENATATE 100; 62.5; 25 UG/1; UG/1; UG/1
1 POWDER RESPIRATORY (INHALATION) DAILY
Qty: 90 EACH | Refills: 3 | Status: SHIPPED | OUTPATIENT
Start: 2024-09-20

## 2024-09-26 ENCOUNTER — APPOINTMENT (OUTPATIENT)
Dept: RADIOLOGY | Facility: HOSPITAL | Age: 79
End: 2024-09-26
Payer: MEDICARE

## 2024-10-08 ENCOUNTER — PATIENT MESSAGE (OUTPATIENT)
Dept: PRIMARY CARE | Facility: CLINIC | Age: 79
End: 2024-10-08
Payer: MEDICARE

## 2024-10-08 DIAGNOSIS — F32.5 DEPRESSION, MAJOR, IN REMISSION (CMS-HCC): ICD-10-CM

## 2024-10-08 RX ORDER — BUPROPION HYDROCHLORIDE 300 MG/1
300 TABLET ORAL DAILY
Qty: 90 TABLET | Refills: 3 | Status: SHIPPED | OUTPATIENT
Start: 2024-10-08

## 2024-10-09 ENCOUNTER — APPOINTMENT (OUTPATIENT)
Dept: OTOLARYNGOLOGY | Facility: CLINIC | Age: 79
End: 2024-10-09
Payer: MEDICARE

## 2024-10-09 DIAGNOSIS — J30.9 ALLERGIC RHINITIS, UNSPECIFIED SEASONALITY, UNSPECIFIED TRIGGER: Primary | ICD-10-CM

## 2024-10-09 PROCEDURE — 1036F TOBACCO NON-USER: CPT | Performed by: OTOLARYNGOLOGY

## 2024-10-09 PROCEDURE — 1123F ACP DISCUSS/DSCN MKR DOCD: CPT | Performed by: OTOLARYNGOLOGY

## 2024-10-09 PROCEDURE — 99213 OFFICE O/P EST LOW 20 MIN: CPT | Performed by: OTOLARYNGOLOGY

## 2024-10-09 PROCEDURE — 1159F MED LIST DOCD IN RCRD: CPT | Performed by: OTOLARYNGOLOGY

## 2024-10-09 RX ORDER — FLUTICASONE PROPIONATE 50 MCG
2 SPRAY, SUSPENSION (ML) NASAL DAILY
Qty: 16 G | Refills: 2 | Status: SHIPPED | OUTPATIENT
Start: 2024-10-09 | End: 2025-10-09

## 2024-10-09 NOTE — PROGRESS NOTES
Chief Complaint     History of Present Illness    10.9.2024: No ear pain or discharge, she is getting hearing aids. Sinus problems recently, thinks it is due to weather.    On examination, there was mild wax (dry hard) in left ear canal at this entrance. Cleaning was done. Nasal turbinate mucosa looks pale.    Recommendations:  1- fluticasone nasal spray  2- follow up in one year  ______________________________________________________________________    11.04.2022: She recently had severe headaches. She was hospitalized. Her shunt was loose in her stomach. She lost 30-40 pounds. She wants to get her ear checked for possible recurrence of left ear cholesteatoma, once in a while she feels a tweak in her left ear.     On examination, there was very mild wax in ear canals. Cleaning was done. TMs look intact.  NOse: no visible infection  No postnasal discharge     Recommendation:  1- follow up in 2 years     ____________________________________________________________________________________________        Paz is a 73 yo F. 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 She is doing good. No ear pain, no ear discharge.     HIstorically she had left submandibular gland stone (+) and nasal trauma.      Review of Systems     Constitutional: feeling tired.   Eyes: eye pain, double vision and blurred vision.   ENMT: pain in the ear, vertigo and sinus pressure.   Cardiovascular: chest pain, palpitations, lower extremity edema and dizziness upon standing.   Respiratory: dyspnea during exertion.   Gastrointestinal: heartburn, abdominal pain and nausea.   Musculoskeletal: arthralgias, myalgias, joint swelling, joint stiffness, limb pain and limb swelling.   Integumentary: itching and dry skin.   Neurological: headache, numbness, weakness and migraines.   Psychiatric: anxiety, depression and sleep disturbances.   Endocrine: increased thirst and muscle weakness.   Hematologic/Lymphatic: a  tendency for easy bruising.   All other systems have been reviewed and are negative for complaint.   Constitutional: no fever.   ENMT: no pain in the ear.            Physical Exam  (old exam note)  General appearance: Healthy-appearing, well-nourished, well groomed, in no acute distress.      Head and Face: Atraumatic with no masses, lesions, or scarring.      Salivary glands: No tenderness of the parotid glands or parotid masses.      No tenderness of the submandibular glands or submandibular masses.      Facial strength: Normal strength and symmetry, no synkinesis or facial tic.      Eyes: Conjunctivas look non-hyperemic bilaterally     Ears: RIght ear canal looks normal. Right tympanic membrane looks intact, no hyperemia, fluid or retraction. Left ear canal looks open. No recurrence was seen on exam. Previously I noted that she has mild herniation of left tmj into ear canal. Left tympanic membrane looks intact. No ear discharge.  ____________________________________________________________________________________________        11.04.2022:   Ears: On examination, there was very mild wax in ear canals. Cleaning was done. TMs look intact.  Nose: no visible infection  Throat: No postnasal discharge  ____________________________________________________________________________________________        Nose: No purulent discharge. Both nasal cavities look open.     Oral Cavity/Mouth: Lips and tongue look normal.      Throat: No purulent discharge. No hyperemia  Neck: Left submandibular gland feels firm to palpation, nut no tenderness     Pulmonary: Normal respiratory effort.      Lymphatic No palpable pathologic lymph nodes at neck.      Neurological/Psychiatric Orientation to person, place, and time: Normal.   Mood and affect: Normal.      Extremities: No clubbing.      Skin: No skin lesions were noted at face or neck       Patient Discussion/Summary     10.9.2024: No ear pain or discharge, she is getting hearing aids.  Sinus problems recently, thinks it is due to weather.    On examination, there was mild wax (dry hard) in left ear canal at this entrance. Cleaning was done. Nasal turbinate mucosa looks pale.    Recommendations:  1- fluticasone nasal spray  2- follow up in one year  ______________________________________________________________________    11.04.2022: She recently had severe headaches. She was hospitalized. Her shunt was loose in her stomach. She lost 30-40 pounds. She wants to get her ear checked for possible recurrence of left ear cholesteatoma, once in a while she feels a tweak in her left ear.     On examination, there was very mild wax in ear canals. Cleaning was done. TMs look intact.  Nose: no visible infection  No postnasal discharge     Recommendation:  1- follow up in 2 years     ____________________________________________________________________________________________     Patient had left ear canal cholesteatoma. She had surgery on Dec 19, 2017. She is doing good. No recurrence on exam.     Recommendations:  1- Hearing test.   2- follow up in one year

## 2025-01-15 ENCOUNTER — APPOINTMENT (OUTPATIENT)
Dept: PRIMARY CARE | Facility: CLINIC | Age: 80
End: 2025-01-15
Payer: MEDICARE

## 2025-02-20 ENCOUNTER — APPOINTMENT (OUTPATIENT)
Dept: PRIMARY CARE | Facility: CLINIC | Age: 80
End: 2025-02-20
Payer: MEDICARE

## 2025-02-20 VITALS
SYSTOLIC BLOOD PRESSURE: 118 MMHG | WEIGHT: 118 LBS | HEIGHT: 60 IN | BODY MASS INDEX: 23.16 KG/M2 | DIASTOLIC BLOOD PRESSURE: 72 MMHG

## 2025-02-20 DIAGNOSIS — E78.00 ELEVATED LDL CHOLESTEROL LEVEL: ICD-10-CM

## 2025-02-20 DIAGNOSIS — J42 CHRONIC BRONCHITIS, UNSPECIFIED CHRONIC BRONCHITIS TYPE (MULTI): ICD-10-CM

## 2025-02-20 DIAGNOSIS — F32.5 DEPRESSION, MAJOR, IN REMISSION (CMS-HCC): ICD-10-CM

## 2025-02-20 DIAGNOSIS — Z00.00 ROUTINE GENERAL MEDICAL EXAMINATION AT HEALTH CARE FACILITY: Primary | ICD-10-CM

## 2025-02-20 DIAGNOSIS — R73.03 PREDIABETES: ICD-10-CM

## 2025-02-20 DIAGNOSIS — R10.33 PERIUMBILICAL ABDOMINAL PAIN: ICD-10-CM

## 2025-02-20 DIAGNOSIS — G91.9 HYDROCEPHALUS, UNSPECIFIED TYPE (MULTI): ICD-10-CM

## 2025-02-20 DIAGNOSIS — K21.9 GASTROESOPHAGEAL REFLUX DISEASE WITHOUT ESOPHAGITIS: ICD-10-CM

## 2025-02-20 DIAGNOSIS — I10 BENIGN ESSENTIAL HYPERTENSION: ICD-10-CM

## 2025-02-20 PROCEDURE — 1123F ACP DISCUSS/DSCN MKR DOCD: CPT | Performed by: FAMILY MEDICINE

## 2025-02-20 PROCEDURE — G0439 PPPS, SUBSEQ VISIT: HCPCS | Performed by: FAMILY MEDICINE

## 2025-02-20 PROCEDURE — 1036F TOBACCO NON-USER: CPT | Performed by: FAMILY MEDICINE

## 2025-02-20 PROCEDURE — 3074F SYST BP LT 130 MM HG: CPT | Performed by: FAMILY MEDICINE

## 2025-02-20 PROCEDURE — 1160F RVW MEDS BY RX/DR IN RCRD: CPT | Performed by: FAMILY MEDICINE

## 2025-02-20 PROCEDURE — 99214 OFFICE O/P EST MOD 30 MIN: CPT | Performed by: FAMILY MEDICINE

## 2025-02-20 PROCEDURE — 1159F MED LIST DOCD IN RCRD: CPT | Performed by: FAMILY MEDICINE

## 2025-02-20 PROCEDURE — 3078F DIAST BP <80 MM HG: CPT | Performed by: FAMILY MEDICINE

## 2025-02-20 PROCEDURE — 1170F FXNL STATUS ASSESSED: CPT | Performed by: FAMILY MEDICINE

## 2025-02-20 RX ORDER — AMLODIPINE BESYLATE 5 MG/1
5 TABLET ORAL DAILY
Qty: 90 TABLET | Refills: 1 | Status: SHIPPED | OUTPATIENT
Start: 2025-02-20

## 2025-02-20 RX ORDER — ATORVASTATIN CALCIUM 80 MG/1
80 TABLET, FILM COATED ORAL NIGHTLY
Qty: 90 TABLET | Refills: 1 | Status: SHIPPED | OUTPATIENT
Start: 2025-02-20

## 2025-02-20 RX ORDER — BUPROPION HYDROCHLORIDE 300 MG/1
300 TABLET ORAL DAILY
Qty: 90 TABLET | Refills: 3 | Status: SHIPPED | OUTPATIENT
Start: 2025-02-20

## 2025-02-20 RX ORDER — OMEPRAZOLE 20 MG/1
20 CAPSULE, DELAYED RELEASE ORAL DAILY
Qty: 90 CAPSULE | Refills: 3 | Status: SHIPPED | OUTPATIENT
Start: 2025-02-20 | End: 2026-02-20

## 2025-02-20 RX ORDER — FLUTICASONE FUROATE, UMECLIDINIUM BROMIDE AND VILANTEROL TRIFENATATE 100; 62.5; 25 UG/1; UG/1; UG/1
1 POWDER RESPIRATORY (INHALATION) DAILY
Qty: 90 EACH | Refills: 3 | Status: SHIPPED | OUTPATIENT
Start: 2025-02-20

## 2025-02-20 RX ORDER — ESCITALOPRAM OXALATE 20 MG/1
20 TABLET ORAL DAILY
Qty: 90 TABLET | Refills: 3 | Status: SHIPPED | OUTPATIENT
Start: 2025-02-20

## 2025-02-20 RX ORDER — LISINOPRIL 5 MG/1
5 TABLET ORAL DAILY
Qty: 90 TABLET | Refills: 1 | Status: SHIPPED | OUTPATIENT
Start: 2025-02-20

## 2025-02-20 RX ORDER — LATANOPROST 50 UG/ML
SOLUTION/ DROPS OPHTHALMIC
COMMUNITY
Start: 2025-02-18

## 2025-02-20 ASSESSMENT — PATIENT HEALTH QUESTIONNAIRE - PHQ9
1. LITTLE INTEREST OR PLEASURE IN DOING THINGS: NOT AT ALL
SUM OF ALL RESPONSES TO PHQ9 QUESTIONS 1 AND 2: 0
SUM OF ALL RESPONSES TO PHQ9 QUESTIONS 1 AND 2: 0
2. FEELING DOWN, DEPRESSED OR HOPELESS: NOT AT ALL
1. LITTLE INTEREST OR PLEASURE IN DOING THINGS: NOT AT ALL
2. FEELING DOWN, DEPRESSED OR HOPELESS: NOT AT ALL
1. LITTLE INTEREST OR PLEASURE IN DOING THINGS: NOT AT ALL
2. FEELING DOWN, DEPRESSED OR HOPELESS: NOT AT ALL
SUM OF ALL RESPONSES TO PHQ9 QUESTIONS 1 AND 2: 0

## 2025-02-20 ASSESSMENT — ACTIVITIES OF DAILY LIVING (ADL)
DRESSING: INDEPENDENT
GROCERY_SHOPPING: INDEPENDENT
MANAGING_FINANCES: INDEPENDENT
BATHING: INDEPENDENT
TAKING_MEDICATION: INDEPENDENT
DOING_HOUSEWORK: INDEPENDENT

## 2025-02-20 NOTE — PATIENT INSTRUCTIONS
Lets decrease the Lisinopril to 5 mg a day  - see if that helps the dizziness      You need better nutrition - try smoothies - there are all kinds - look for a Nutrabullet or something similar.       Call  to set up CT scan             Ways to Help Prevent Falls at Home    Quick Tips   ? Ask for help if you need it. Most people want to help!   ? Get up slowly after sitting or laying down   ? Wear a medical alert device or keep cell phone in your pocket   ? Use night lights, especially areas near a bathroom   ? Keep the items you use often within reach on a small stool or end table   ? Use an assistive device such as walker or cane, as directed by provider/physical therapy   ? Use a non-slip mat and grab bars in your bathroom. Look for home health sections for best options     Other Areas to Focus On   ? Exercise and nutrition: Regular exercise or taking a falls prevention class are great ways improve strength and balance. Don’t forget to stay hydrated and bring a snack!   ? Medicine side effects: Some medicines can make you sleepy or dizzy, which could cause a fall. Ask your healthcare provider about the side effects your medicines could cause. Be sure to let them know if you take any vitamins or supplements as well.   ? Tripping hazards: Remove items you could trip on, such as loose mats, rugs, cords, and clutter. Wear closed toe shoes with rubber soles.   ? Health and wellness: Get regular checkups with your healthcare provider, plus routine vision and hearing screenings. Talk with your healthcare provider about:   o Your medicines and the possible side effects - bring them in a bag if that is easier!   o Problems with balance or feeling dizzy   o Ways to promote bone health, such as Vitamin D and calcium supplements   o Questions or concerns about falling     *Ask your healthcare team if you have questions     [facility], 2022       Hypertension Reminders:    IF YOU ARE A PERSON WHOSE BLOOD  "PRESSURE RUNS HIGH IN THE DOCTOR'S OFFICE,  THEN WE NEED TO VERIFY YOUR CUFF AT LEAST  ONCE YEARLY.   ALWAYS BRING CUFF WITH YOU TO ANY HYPERTENSION CHECK UP APPOINTMENT.  WE CAN RECORD YOUR BP  FROM HOME THE DAY OF THE APPOINTMENT - BUT WE HAVE TO SEE IT ON YOUR MACHINE.      To accurately check your blood pressure -  be sure to sit and relax for 5 minutes, you need your back supported, feet flat on the ground, arm heart level and relaxed.    Generally speaking, well controlled hypertension is below 130/80   for  most people  and if you are over 75, below 140/90  is acceptable.    Please take your medication as directed, and if you forget a dose  DO NOT DOUBLE THE DOSE THE NEXT DAY, just take is as you normally would.     It is important to stay on a low sodium diet :  1500 - 2000 mg of sodium a day  -  it is important to read labels.    Regular Exercise is very important as well.  Always gradually increase your exercise regimen.  Your goal is 30 minutes of a good cardiovascular exercise at least 5 days a week.    IF YOUR BMI (BODY MASS INDEX) IS OVER 25, LOSING WEIGHT WILL HELP CONTROL YOUR BLOOD PRESSURE.   Talk to me further if you need help doing this.        It is very important NOT to smoke  or use any tobacco products.  Talk to me about options if you want help quitting.    It is very important to keep your alcohol in take low.   Generally speaking, adult men should not drink more than 2 regular size beers a day, or no more than 2 ounces of liquor, or no more than 12 ounces of wine.  For adult women - the recommendations are half that.    BUT , THIS IS NOT UNIVERSAL   - be sure to ask me if alcohol is safe for you to drink, and if so, the acceptable amount.      ABOUT MEDICARE PREVENTATIVE APPOINTMENTS:     YOUR YEARLY MEDICARE WELLNESS VISIT IS VERY IMPORTANT.      Medicare wants all of their patients to schedule their \"Welcome to Medicare\" visit  when you are in the first 12 months of being on Medicare.    " "Then every year after that, they want you to schedule your  \"Annual Wellness\"  visit.     These visits have a very specific list of topics I have to cover at the visit,  so you will have paperwork you need to complete before I see you.   Of interest,  there is NOT actually a physical exam as part of this visit.       If you are female,   a Well Woman Exam  (Breast exam and pelvic exam) - are paid for by Medicare every 2 years.   Mammograms are paid for every year.    If you are due for this exam too,  the Medicare wellness and the well woman exam can be done on the same day,  PLEASE TELL THIS TO  WHEN MAKING THE APPOINTMENT.      Some of the Medicare plans ALSO cover a traditional \"physical\" - which has more of the physical exam component.   You may want to find out if your insurance covers that too.       (this is  the code - 76295)  - That can be added to the visit as well.    You would need to tell us.     IT'S VERY HELPFUL IF YOU KNOW WHAT YOUR PLAN COVERS AHEAD OF THE VISIT.      Please check to see what your plan(s) cover.   (Even checking on testing and vaccines that are covered or not helps us greatly!)     At these appointments ,  IF  we cover any of your chronic medical conditions,  medical concerns,  or medications,  I will also be billing alvin  \"E/M  code\" which stands for \"Evaluation and Management\"    -  which is a regular office visit code.    THAT CHARGE MAY BE SUBJECT TO CO-PAYS AND DEDUCTIBLES.     PLEASE DO NOT \"SAVE\" ALL OF YOUR CONCERNS TO GO OVER AT THESE YEARLY WELLNESS VISITS.   YOU SHOULD BE SCHEDULING SEPARATE APPOINTMENTS WHEN YOU HAVE HEALTH CONCERNS TO DISCUSS AND FOR YOUR MEDICATION CHECK UP APPOINTMENTS.        Thank you.            WELL VISIT/PREVENTATIVE VISIT INSTRUCTIONS:        Call [phone] to schedule any testing ordered at [facility].      For a mammogram, call   [phone] .    Please work on healthy nutrition,  optimal sleep, and regular exercise to " feel better.    If you smoke - please quit, and if you drink alcohol, please limit your intake.       Be sure to ask me about any vaccines you may be due for,  and ask me any questions you may have about vaccines.   Generally -  a flu shot is recommended every fall for everyone over 6 months of age,  a pneumonia shot is recommended for anyone 65 and over or who has chronic conditions such as diabetes, heart or lung disease,  the shingles vaccines are recommended for people age 50 and over,   a Tetnas/Pertussis booster is very 10 years (after the childhood set);  the RSV vaccine is for people age 65 and over,  and the COVID vaccines are recommended for everyone, but the boosters are often particular people, so ask me if you qualify.      There may be more vaccines you qualify for depending on your medical conditions, so be sure to ask me about that if you have any chronic illnesses.    Some people have insurance that will pay for the vaccines at the pharmacy, and some your doctors office - so be sure to check with your insurance about the best place to get your vaccines and your coverage of them.     Generally speaking,  good nutrition consists of lean meats, like chicken, fish and turkey (not fried);    plenty of fruits and vegetables (the fresher the better);   Less sugars, saturated fats, and processed foods - especially those made with white flour.   Try to eat the majority of your grain foods  as whole grains.   Keep an eye on your total calories in a day and serving sizes on packaging - this will help you limit your overall intake.    Remember, to lose weight (if you need to), your total calorie intake has to be about 300 - 500 calories less than what you burn in a day.   That number depends on your age, gender and body size.   Some people find a fitbit (calorie tracking device) helpful.      Please be sure to see the dentist every 6 months.    Please see the eye doctor no longer than every 2 years.    When you  "have your Living Will and Medical Power of  papers completed   (they have to be witnessed by 2 non-relatives or notarized to be legally binding),     please keep your originals in a safe place in your home, but make sure all your physicians have copies and that you take copies with you when you go to the hospital.        GETTING TEST RESULTS:    YOU WILL ALWAYS FIND OUT ABOUT ALL YOUR TEST RESULTS FROM ME.  I do not use the \"no news is good news\" policy.   The only time you would not, is if I have told you to get the testing done, and make a follow up appointment to go over it.    Then I will be waiting to talk to you at the visit about your test results.     I have a few different ways I get test results to you  (if its something urgent, we call you)  :       If you have a [employer] card -  I will have your test results on your secureach card within a week.  You should get a phone call telling you when the results are on the card, or just call the card in a week.   If two weeks go  by and you have not heard anything, call the card to see if the results are there,  and if not,  leave me a message.  If you are having trouble using [employer], they have a support line you should call :   [phone];   If you have lost your card, I need to know.     IT'S VERY IMPORTANT THAT YOU CALL TO LISTEN TO YOUR RESULTS, AS IT THEN LETS ME KNOW YOU GOT YOUR RESULTS.        If you get your test results on MY CHART,  you may commonly see your results even before I do.      I will always annotate a message on your results so you know that I saw them and how I feel about them.     If you need some help with MY CHART call the support number at [phone].    IF I mail your results to you,   I need to hear from you if you do not receive them within 2 weeks.      Be sure to let me know your preference for getting results.         BILLING FOR PREVENTATIVE VISITS.     Most insurance companies pay for a yearly \"Wellness " "Check\"  or they may call it a \"Preventative Physical\"   - but it's important to know what your plan covers ahead of the visit.    It's also a good idea to find out what sort of preventative testing they will cover for screening tests - like cholesterol, blood sugar, or colonoscopies. Also, find out which vaccines are covered and if you have any responsibility in paying for them.       If at your Wellness Visit,  we cover anything to do with problems/issues  you are having or  chronic medications/ medical conditions you have,   there will ALSO be a regular office charge that day.     Blood work  or testing obtained that has anything to do with an acute issue or chronic condition is billed under that diagnosis and not screening.       It's a good idea to find out from your insurance what is covered and what is your responsibility for all of the above possible charges.           Most importantly,   if you ever have any questions or concerns that cannot wait until your next visit with me,  you can message me through MY CHART or call the office and leave a voice mail message  (please allow for at least 24 hours for responses for these messages).       Take good care.   Dr Cyr          "

## 2025-02-20 NOTE — ASSESSMENT & PLAN NOTE
Orders:    buPROPion XL (Wellbutrin XL) 300 mg 24 hr tablet; Take 1 tablet (300 mg) by mouth once daily. Do not crush, chew, or split.    escitalopram (Lexapro) 20 mg tablet; Take 1 tablet (20 mg) by mouth once daily.

## 2025-02-20 NOTE — ASSESSMENT & PLAN NOTE
S/p  shunt  -  DR Ibarra -   Revision 2022 - to make a follow up with one of his colleagues

## 2025-02-20 NOTE — PROGRESS NOTES
"Subjective   Patient ID: Paz Farmer is a 79 y.o. female who presents for Medicare Annual Wellness Visit Initial and follow up     HPI     Past Medical, Surgical, and Family History reviewed and updated in chart.     Reviewed all medications by prescribing practitioner or clinical pharmacist (such as prescriptions, OTCs, herbal therapies and supplements) and documented in the medical record.       LOV  7/2024 -       Updates and concerns:      MCR wellness today and follow up     When she gets up at night -   Feels lightheaded     Gets dizzy when she goes to lie down at night    Feeling lumps over where her shunt is on abdomen - tender at times     Finding she has to push when she urinates     DR Ibarra placed the Shunt   He retired - she has names of people she is supposed to follow up with - has not called yet     Needs refills     Not eating well because of her teeth   Can't eat meat      Declines ACO referral       Had a  fall   1/27/25  - hurt her nose             Chronic issues and meds:      HTN -   amlodipine 5 mg daily;   lisinopril 10 mg a day;       Stress test 5/2018 WNL, EF 60%   2010 - Coronary Cath done - clear but a small rt cor artery      Prediabetes: Last A1C - 1/2023 -  5.7%   1/2024 -  5.5%  Nutrition: has been eating better ; KEEPING WT OFF      Upper teeth have been removed - back to wearing her old upper dentures        COPD -   TRELEGY QD  - has a lot on hand   Proair prn - WORKING GREAT      Hyperchol - atorvastatin 80 mg a day      Headaches -  off topirimate -   Headaches are better when she wears her teeth      Did have to have shunt revision with DR Ibarra - 12/27/22  \"Right Ventriculoperitoneal Shunt Valve Exchange\"   Was having intracranial hypotension - severe headaches -   Head feels great.    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      Depression - Bupropion  mg a day, escitalopram 20 mg a day - Mood is doing much better      LABS JULY 2021 - Renal function mildly low -   and HBG mildly low - stool cards neg      Has hearing issues - has OTC hearing aids -   Does not want audiology eval        MCR wellness  2/20/25  MCR WWE in 10/2020   Last Mamm   WNL  7/26/24         No breast concerns   No longer needs paps      DEXA osteopenia   4/2024  - osteopenia        Last colonoscopy - 2002 ([facility])  COLOGUARD - NEG 11/2020        Stool cards neg 2021       cologuard  pos 7/23/24       Colonoscopy  8/2024 - WNL -        No further scopes needed fro screening                 Living will and Medical POA -           Living will on chart - not medical POA            Who is her DTR Lucretia Price is her significant other - they have been together  over 35 years         Vaccines -   FLU vacc:   UTD   Has had both pneumonia shots;  last one 2017  Prev 20   done   1/11/24  SHINGRIX - HAD BOTH 2019   COVID - AUG and Sept 2021 - does not want booster   Tdap -   RSV -9/22/24        Review of Systems    Objective   /72 (BP Location: Left arm, Patient Position: Sitting, BP Cuff Size: Large adult)   Ht 1.524 m (5')   Wt 53.5 kg (118 lb)   BMI 23.05 kg/m²     Physical Exam  Vitals reviewed.   Constitutional:       General: She is not in acute distress.     Appearance: Normal appearance.   HENT:      Head: Normocephalic and atraumatic.      Nose: Nose normal.      Mouth/Throat:      Mouth: Mucous membranes are moist.      Pharynx: No posterior oropharyngeal erythema.   Eyes:      Extraocular Movements: Extraocular movements intact.      Conjunctiva/sclera: Conjunctivae normal.      Pupils: Pupils are equal, round, and reactive to light.   Cardiovascular:      Rate and Rhythm: Normal rate and regular rhythm.      Heart sounds: Normal heart sounds. No murmur heard.  Pulmonary:      Effort: Pulmonary effort is normal. No  respiratory distress.      Breath sounds: Normal breath sounds. No wheezing.   Musculoskeletal:      Cervical back: No rigidity.   Lymphadenopathy:      Cervical: No cervical adenopathy.   Skin:     General: Skin is warm and dry.      Findings: No rash.   Neurological:      General: No focal deficit present.      Mental Status: She is alert. Mental status is at baseline.   Psychiatric:         Mood and Affect: Mood normal.         Thought Content: Thought content normal.         Assessment/Plan   Assessment & Plan  Routine general medical examination at health care facility         Benign essential hypertension    Orders:    amLODIPine (Norvasc) 5 mg tablet; Take 1 tablet (5 mg) by mouth once daily.    lisinopril 5 mg tablet; Take 1 tablet (5 mg) by mouth once daily.    Elevated LDL cholesterol level    Orders:    atorvastatin (Lipitor) 80 mg tablet; Take 1 tablet (80 mg) by mouth once daily at bedtime.    Depression, major, in remission (CMS-[facility])    Orders:    buPROPion XL (Wellbutrin XL) 300 mg 24 hr tablet; Take 1 tablet (300 mg) by mouth once daily. Do not crush, chew, or split.    escitalopram (Lexapro) 20 mg tablet; Take 1 tablet (20 mg) by mouth once daily.    Chronic bronchitis, unspecified chronic bronchitis type (Multi)    Orders:    fluticasone-umeclidin-vilanter (Trelegy Ellipta) 100-62.5-25 mcg blister with device; Inhale 1 puff once daily.    Gastroesophageal reflux disease without esophagitis    Orders:    omeprazole (PriLOSEC) 20 mg DR capsule; Take 1 capsule (20 mg) by mouth once daily. Do not crush or chew.    Periumbilical abdominal pain         Hydrocephalus, unspecified type (Multi)  S/p  shunt  -  DR Ibarra -   Revision 2022 - to make a follow up with one of his colleagues          Prediabetes    Orders:    Hemoglobin A1C      A few issues today  -   I am concerned BP may be too low -   Cut back on Lisinopril to 5 mg  day     Discussed how to get better nutrition with poor dentition -  To  work on smoothies     Subcut nodules abdomen where shunt enters peritoneum and urinary retention -   Check on CT scan  - order placed       We discussed at visit any disease processes that were of concern as well as the risks, benefits and instructions of any new medication provided.    See orders and discussion section for information handed to patient on their Clinical Summary.   Patient (and/or caretaker of patient if present)  stated all questions were answered, and they voiced understanding of instructions.    Patient was identified as a fall risk. Risk prevention instructions provided.

## 2025-02-20 NOTE — ASSESSMENT & PLAN NOTE
Orders:    amLODIPine (Norvasc) 5 mg tablet; Take 1 tablet (5 mg) by mouth once daily.    lisinopril 5 mg tablet; Take 1 tablet (5 mg) by mouth once daily.

## 2025-02-21 LAB
ALBUMIN SERPL-MCNC: 4.3 G/DL (ref 3.6–5.1)
ALP SERPL-CCNC: 62 U/L (ref 37–153)
ALT SERPL-CCNC: 12 U/L (ref 6–29)
ANION GAP SERPL CALCULATED.4IONS-SCNC: 9 MMOL/L (CALC) (ref 7–17)
AST SERPL-CCNC: 15 U/L (ref 10–35)
BASOPHILS # BLD AUTO: 59 CELLS/UL (ref 0–200)
BASOPHILS NFR BLD AUTO: 1.4 %
BILIRUB SERPL-MCNC: 0.5 MG/DL (ref 0.2–1.2)
BUN SERPL-MCNC: 14 MG/DL (ref 7–25)
CALCIUM SERPL-MCNC: 8.9 MG/DL (ref 8.6–10.4)
CHLORIDE SERPL-SCNC: 105 MMOL/L (ref 98–110)
CHOLEST SERPL-MCNC: 173 MG/DL
CHOLEST/HDLC SERPL: 2.4 (CALC)
CO2 SERPL-SCNC: 25 MMOL/L (ref 20–32)
CREAT SERPL-MCNC: 0.85 MG/DL (ref 0.6–1)
EGFRCR SERPLBLD CKD-EPI 2021: 70 ML/MIN/1.73M2
EOSINOPHIL # BLD AUTO: 479 CELLS/UL (ref 15–500)
EOSINOPHIL NFR BLD AUTO: 11.4 %
ERYTHROCYTE [DISTWIDTH] IN BLOOD BY AUTOMATED COUNT: 15.5 % (ref 11–15)
EST. AVERAGE GLUCOSE BLD GHB EST-MCNC: 120 MG/DL
EST. AVERAGE GLUCOSE BLD GHB EST-SCNC: 6.6 MMOL/L
GLUCOSE SERPL-MCNC: 89 MG/DL (ref 65–99)
HBA1C MFR BLD: 5.8 % OF TOTAL HGB
HCT VFR BLD AUTO: 33.1 % (ref 35–45)
HDLC SERPL-MCNC: 72 MG/DL
HGB BLD-MCNC: 10.8 G/DL (ref 11.7–15.5)
LDLC SERPL CALC-MCNC: 88 MG/DL (CALC)
LYMPHOCYTES # BLD AUTO: 1281 CELLS/UL (ref 850–3900)
LYMPHOCYTES NFR BLD AUTO: 30.5 %
MCH RBC QN AUTO: 29.4 PG (ref 27–33)
MCHC RBC AUTO-ENTMCNC: 32.6 G/DL (ref 32–36)
MCV RBC AUTO: 90.2 FL (ref 80–100)
MONOCYTES # BLD AUTO: 349 CELLS/UL (ref 200–950)
MONOCYTES NFR BLD AUTO: 8.3 %
NEUTROPHILS # BLD AUTO: 2033 CELLS/UL (ref 1500–7800)
NEUTROPHILS NFR BLD AUTO: 48.4 %
NONHDLC SERPL-MCNC: 101 MG/DL (CALC)
PLATELET # BLD AUTO: 280 THOUSAND/UL (ref 140–400)
PMV BLD REES-ECKER: 10.6 FL (ref 7.5–12.5)
POTASSIUM SERPL-SCNC: 3.8 MMOL/L (ref 3.5–5.3)
PROT SERPL-MCNC: 6.6 G/DL (ref 6.1–8.1)
RBC # BLD AUTO: 3.67 MILLION/UL (ref 3.8–5.1)
SODIUM SERPL-SCNC: 139 MMOL/L (ref 135–146)
TRIGL SERPL-MCNC: 52 MG/DL
TSH SERPL-ACNC: 0.51 MIU/L (ref 0.4–4.5)
WBC # BLD AUTO: 4.2 THOUSAND/UL (ref 3.8–10.8)

## 2025-03-11 ENCOUNTER — HOSPITAL ENCOUNTER (OUTPATIENT)
Dept: RADIOLOGY | Facility: HOSPITAL | Age: 80
Discharge: HOME | End: 2025-03-11
Payer: MEDICARE

## 2025-03-11 DIAGNOSIS — R10.33 PERIUMBILICAL ABDOMINAL PAIN: ICD-10-CM

## 2025-03-11 PROCEDURE — 2550000001 HC RX 255 CONTRASTS: Performed by: FAMILY MEDICINE

## 2025-03-11 PROCEDURE — 74177 CT ABD & PELVIS W/CONTRAST: CPT

## 2025-03-11 RX ADMIN — IOHEXOL 75 ML: 350 INJECTION, SOLUTION INTRAVENOUS at 09:21

## 2025-03-30 ENCOUNTER — PATIENT MESSAGE (OUTPATIENT)
Dept: PRIMARY CARE | Facility: CLINIC | Age: 80
End: 2025-03-30
Payer: MEDICARE

## 2025-03-30 DIAGNOSIS — J44.9 CHRONIC OBSTRUCTIVE PULMONARY DISEASE, UNSPECIFIED COPD TYPE (MULTI): Primary | ICD-10-CM

## 2025-08-15 DIAGNOSIS — E78.00 ELEVATED LDL CHOLESTEROL LEVEL: ICD-10-CM

## 2025-08-15 RX ORDER — ATORVASTATIN CALCIUM 80 MG/1
80 TABLET, FILM COATED ORAL NIGHTLY
Qty: 90 TABLET | Refills: 1 | Status: SHIPPED | OUTPATIENT
Start: 2025-08-15

## 2025-08-26 ENCOUNTER — PATIENT MESSAGE (OUTPATIENT)
Dept: PRIMARY CARE | Facility: CLINIC | Age: 80
End: 2025-08-26
Payer: MEDICARE

## 2025-08-26 DIAGNOSIS — I10 BENIGN ESSENTIAL HYPERTENSION: ICD-10-CM

## 2025-08-26 RX ORDER — AMLODIPINE BESYLATE 5 MG/1
5 TABLET ORAL DAILY
Qty: 90 TABLET | Refills: 1 | Status: SHIPPED | OUTPATIENT
Start: 2025-08-26

## 2025-08-29 ENCOUNTER — APPOINTMENT (OUTPATIENT)
Dept: PRIMARY CARE | Facility: CLINIC | Age: 80
End: 2025-08-29
Payer: MEDICARE

## 2025-10-08 ENCOUNTER — APPOINTMENT (OUTPATIENT)
Dept: OTOLARYNGOLOGY | Facility: CLINIC | Age: 80
End: 2025-10-08
Payer: MEDICARE